# Patient Record
Sex: FEMALE | Race: WHITE | NOT HISPANIC OR LATINO | Employment: OTHER | URBAN - METROPOLITAN AREA
[De-identification: names, ages, dates, MRNs, and addresses within clinical notes are randomized per-mention and may not be internally consistent; named-entity substitution may affect disease eponyms.]

---

## 2017-01-06 RX ORDER — LEVOTHYROXINE SODIUM 50 UG/1
TABLET ORAL
Qty: 90 TABLET | Refills: 3 | Status: SHIPPED | OUTPATIENT
Start: 2017-01-06 | End: 2017-12-01

## 2017-02-24 RX ORDER — NORTRIPTYLINE HYDROCHLORIDE 25 MG/1
CAPSULE ORAL
Qty: 90 CAPSULE | Refills: 3 | Status: SHIPPED | OUTPATIENT
Start: 2017-02-24 | End: 2018-03-10 | Stop reason: SDUPTHER

## 2017-05-29 ENCOUNTER — OFFICE VISIT (OUTPATIENT)
Dept: FAMILY MEDICINE | Facility: CLINIC | Age: 72
End: 2017-05-29
Payer: MEDICARE

## 2017-05-29 VITALS
RESPIRATION RATE: 18 BRPM | WEIGHT: 155.19 LBS | BODY MASS INDEX: 26.49 KG/M2 | DIASTOLIC BLOOD PRESSURE: 68 MMHG | HEART RATE: 63 BPM | SYSTOLIC BLOOD PRESSURE: 128 MMHG | OXYGEN SATURATION: 96 % | HEIGHT: 64 IN

## 2017-05-29 DIAGNOSIS — L40.9 PSORIASIS OF NAIL: ICD-10-CM

## 2017-05-29 DIAGNOSIS — Z12.39 BREAST CANCER SCREENING: ICD-10-CM

## 2017-05-29 DIAGNOSIS — E03.9 ACQUIRED HYPOTHYROIDISM: ICD-10-CM

## 2017-05-29 DIAGNOSIS — F41.9 ANXIETY: ICD-10-CM

## 2017-05-29 DIAGNOSIS — I10 ESSENTIAL HYPERTENSION: Primary | ICD-10-CM

## 2017-05-29 DIAGNOSIS — E78.5 HYPERLIPIDEMIA, UNSPECIFIED HYPERLIPIDEMIA TYPE: ICD-10-CM

## 2017-05-29 PROCEDURE — 1126F AMNT PAIN NOTED NONE PRSNT: CPT | Mod: S$GLB,,, | Performed by: FAMILY MEDICINE

## 2017-05-29 PROCEDURE — 99999 PR PBB SHADOW E&M-EST. PATIENT-LVL III: CPT | Mod: PBBFAC,,, | Performed by: FAMILY MEDICINE

## 2017-05-29 PROCEDURE — 1159F MED LIST DOCD IN RCRD: CPT | Mod: S$GLB,,, | Performed by: FAMILY MEDICINE

## 2017-05-29 PROCEDURE — 99214 OFFICE O/P EST MOD 30 MIN: CPT | Mod: S$GLB,,, | Performed by: FAMILY MEDICINE

## 2017-05-29 PROCEDURE — 99499 UNLISTED E&M SERVICE: CPT | Mod: S$GLB,,, | Performed by: FAMILY MEDICINE

## 2017-05-29 RX ORDER — ATENOLOL 25 MG/1
25 TABLET ORAL DAILY
Qty: 90 TABLET | Refills: 3 | Status: SHIPPED | OUTPATIENT
Start: 2017-05-29 | End: 2018-06-05 | Stop reason: SDUPTHER

## 2017-05-29 RX ORDER — CLOTRIMAZOLE AND BETAMETHASONE DIPROPIONATE 10; .64 MG/G; MG/G
CREAM TOPICAL 2 TIMES DAILY
Qty: 45 G | Refills: 2 | Status: SHIPPED | OUTPATIENT
Start: 2017-05-29 | End: 2017-12-01

## 2017-05-29 RX ORDER — FENOFIBRATE 160 MG/1
160 TABLET ORAL DAILY
Qty: 90 TABLET | Refills: 3 | Status: SHIPPED | OUTPATIENT
Start: 2017-05-29 | End: 2018-07-22 | Stop reason: SDUPTHER

## 2017-05-29 RX ORDER — ESCITALOPRAM OXALATE 20 MG/1
20 TABLET ORAL DAILY
COMMUNITY
End: 2017-05-29 | Stop reason: SDUPTHER

## 2017-05-29 RX ORDER — ESCITALOPRAM OXALATE 20 MG/1
20 TABLET ORAL DAILY
Qty: 90 TABLET | Refills: 3 | Status: SHIPPED | OUTPATIENT
Start: 2017-05-29 | End: 2018-06-24 | Stop reason: SDUPTHER

## 2017-05-29 NOTE — PROGRESS NOTES
Subjective:       Patient ID: Odette Hewitt is a 71 y.o. female.    Chief Complaint: Hypertension (f/u) and Medication Refill    HPI     htn stable.    Has scales on nails of hands > 2 years.  Also, c/o dryness and redness of the proximal phalanxes of hands > 2 years    Review of Systems      Review of Systems   Constitutional: Negative for fever and chills.   HENT: Negative for hearing loss and neck stiffness.    Eyes: Negative for redness and itching.   Respiratory: Negative for cough and choking.    Cardiovascular: Negative for chest pain and leg swelling.  Abdomen: Negative for abdominal pain and blood in stool.   Genitourinary: Negative for dysuria and flank pain.   Musculoskeletal: Negative for back pain and gait problem.   Neurological: Negative for light-headedness and headaches.   Hematological: Negative for adenopathy.   Psychiatric/Behavioral: Negative for behavioral problems.     Objective:      Physical Exam   Constitutional: She appears well-developed.   HENT:   Head: Normocephalic and atraumatic.   Eyes: Conjunctivae are normal. Pupils are equal, round, and reactive to light.   Neck: Normal range of motion.   Cardiovascular: Normal rate and regular rhythm.    No murmur heard.  Pulmonary/Chest: Effort normal and breath sounds normal. She has no wheezes.   Lymphadenopathy:     She has no cervical adenopathy.       Assessment:       1. Essential hypertension    2. Psoriasis of nail    3. Breast cancer screening    4. Anxiety    5. Hyperlipidemia, unspecified hyperlipidemia type    6. Acquired hypothyroidism        Plan:       Essential hypertension  -     Comprehensive metabolic panel; Future; Expected date: 11/29/2017  -     Lipid panel; Future; Expected date: 11/29/2017  -     TSH; Future; Expected date: 11/29/2017    Psoriasis of nail  -     Ambulatory referral to Dermatology    Breast cancer screening  -     Mammo Digital Screening Bilat with CAD; Future; Expected date:  11/29/2017    Anxiety    Hyperlipidemia, unspecified hyperlipidemia type    Acquired hypothyroidism    Other orders  -     clotrimazole-betamethasone 1-0.05% (LOTRISONE) cream; Apply topically 2 (two) times daily.  Dispense: 45 g; Refill: 2  -     atenolol (TENORMIN) 25 MG tablet; Take 1 tablet (25 mg total) by mouth once daily.  Dispense: 90 tablet; Refill: 3  -     escitalopram oxalate (LEXAPRO) 20 MG tablet; Take 1 tablet (20 mg total) by mouth once daily.  Dispense: 90 tablet; Refill: 3  -     fenofibrate 160 MG Tab; Take 1 tablet (160 mg total) by mouth once daily.  Dispense: 90 tablet; Refill: 3      Plan:  See orders  Start lotrisone for psoriasis. Refer to derm      Medication List with Changes/Refills   New Medications    CLOTRIMAZOLE-BETAMETHASONE 1-0.05% (LOTRISONE) CREAM    Apply topically 2 (two) times daily.   Current Medications    FISH OIL-OMEGA-3 FATTY ACIDS 300-1,000 MG CAPSULE    Take 2 g by mouth once daily.    HYDROCORTISONE 2.5 % CREAM    Apply topically 2 (two) times daily. To small area, thin coat only, smallest area needed    LEVOTHYROXINE (SYNTHROID) 50 MCG TABLET    TAKE ONE TABLET BY MOUTH BEFORE BREAKFAST    NORTRIPTYLINE (PAMELOR) 25 MG CAPSULE    TAKE (1) TABLET BY MOUTH EACH EVENING   Changed and/or Refilled Medications    Modified Medication Previous Medication    ATENOLOL (TENORMIN) 25 MG TABLET atenolol (TENORMIN) 25 MG tablet       Take 1 tablet (25 mg total) by mouth once daily.    Take 1 tablet (25 mg total) by mouth once daily.    ESCITALOPRAM OXALATE (LEXAPRO) 20 MG TABLET escitalopram oxalate (LEXAPRO) 20 MG tablet       Take 1 tablet (20 mg total) by mouth once daily.    Take 20 mg by mouth once daily.    FENOFIBRATE 160 MG TAB fenofibrate 160 MG Tab       Take 1 tablet (160 mg total) by mouth once daily.    Take 1 tablet (160 mg total) by mouth once daily.   Discontinued Medications    ESCITALOPRAM OXALATE (LEXAPRO) 20 MG TABLET    Take 1 tablet (20 mg total) by mouth once  daily.

## 2017-06-02 ENCOUNTER — TELEPHONE (OUTPATIENT)
Dept: FAMILY MEDICINE | Facility: CLINIC | Age: 72
End: 2017-06-02

## 2017-06-16 ENCOUNTER — TELEPHONE (OUTPATIENT)
Dept: FAMILY MEDICINE | Facility: CLINIC | Age: 72
End: 2017-06-16

## 2017-07-05 RX ORDER — ESCITALOPRAM OXALATE 20 MG/1
TABLET ORAL
Qty: 90 TABLET | Refills: 3 | Status: SHIPPED | OUTPATIENT
Start: 2017-07-05 | End: 2017-12-01 | Stop reason: SDUPTHER

## 2017-08-04 ENCOUNTER — TELEPHONE (OUTPATIENT)
Dept: FAMILY MEDICINE | Facility: CLINIC | Age: 72
End: 2017-08-04

## 2017-08-04 NOTE — TELEPHONE ENCOUNTER
----- Message from Stella Andujar sent at 8/4/2017 11:01 AM CDT -----  Contact: [facility] Olive  Calling to request orders to go into the patient's home to conduct a spirometra test.     Please call Olive back at [phone]    Fax number [phone]    Thank you

## 2017-08-07 NOTE — TELEPHONE ENCOUNTER
Spoke with Olive, from [facility], states needs orders to go into home to conduct spirometry testing  Based on claim from 5/29/2017 indicating that she had COPD - all members age 40+ gets this testing done to confirm dx.      (staff - Do not need MNF,  already has) Fax number listed below.

## 2017-08-31 ENCOUNTER — TELEPHONE (OUTPATIENT)
Dept: FAMILY MEDICINE | Facility: CLINIC | Age: 72
End: 2017-08-31

## 2017-09-21 ENCOUNTER — TELEPHONE (OUTPATIENT)
Dept: FAMILY MEDICINE | Facility: CLINIC | Age: 72
End: 2017-09-21

## 2017-09-28 ENCOUNTER — TELEPHONE (OUTPATIENT)
Dept: FAMILY MEDICINE | Facility: CLINIC | Age: 72
End: 2017-09-28

## 2017-11-17 ENCOUNTER — PATIENT OUTREACH (OUTPATIENT)
Dept: ADMINISTRATIVE | Facility: HOSPITAL | Age: 72
End: 2017-11-17

## 2017-11-17 NOTE — PROGRESS NOTES
Pre-visit outreach via mail      Health Maintenance Due   Topic Date Due    Pneumococcal (65+) (2 of 2 - PPSV23) 05/27/2017    Influenza Vaccine  08/01/2017    DEXA SCAN  11/11/2017    Mammogram  12/01/2017

## 2017-11-17 NOTE — LETTER
November 17, 2017    Odette Hewitt  [address] [id number]             [facility]  [address]  [facility] [address]  Phone: [phone] Dear Ms. Hewitt:    [facility] is committed to your overall health.  To help you get the most out of each of your visits, we will review your information to make sure you are up to date on all of your recommended tests and/or procedures.      Dr. Benítez        has found that you may be due for:    Pneumonia immunization  Influenza vaccine  Osteoporosis screening (DEXA SCAN)    REMINDER:  lab appointment  11/29/17  Mammogram  12/1/17 at 2:45 pm    If you have had any of the above done at another facility, please bring the records or information with you so that your record at [facility] will be complete.     If you are currently taking medication, please bring it with you to your appointment for review.    If you have any questions or concerns, please don't hesitate to call.    Sincerely,    Kaitlin Milligan  Clinical Care Coordinator  [facility] [address] [facility] [address]  [place] [address]  Phone: [phone]   Fax: [phone]

## 2017-11-30 ENCOUNTER — LAB VISIT (OUTPATIENT)
Dept: LAB | Facility: HOSPITAL | Age: 72
End: 2017-11-30
Attending: FAMILY MEDICINE
Payer: MEDICARE

## 2017-11-30 DIAGNOSIS — I10 ESSENTIAL HYPERTENSION: ICD-10-CM

## 2017-11-30 LAB
ALBUMIN SERPL BCP-MCNC: 3.5 G/DL
ALP SERPL-CCNC: 52 U/L
ALT SERPL W/O P-5'-P-CCNC: 32 U/L
ANION GAP SERPL CALC-SCNC: 7 MMOL/L
AST SERPL-CCNC: 36 U/L
BILIRUB SERPL-MCNC: 0.3 MG/DL
BUN SERPL-MCNC: 18 MG/DL
CALCIUM SERPL-MCNC: 9.7 MG/DL
CHLORIDE SERPL-SCNC: 105 MMOL/L
CHOLEST SERPL-MCNC: 200 MG/DL
CHOLEST/HDLC SERPL: 5.1 {RATIO}
CO2 SERPL-SCNC: 29 MMOL/L
CREAT SERPL-MCNC: 0.9 MG/DL
EST. GFR  (AFRICAN AMERICAN): >60 ML/MIN/1.73 M^2
EST. GFR  (NON AFRICAN AMERICAN): >60 ML/MIN/1.73 M^2
GLUCOSE SERPL-MCNC: 101 MG/DL
HDLC SERPL-MCNC: 39 MG/DL
HDLC SERPL: 19.5 %
LDLC SERPL CALC-MCNC: 126.2 MG/DL
NONHDLC SERPL-MCNC: 161 MG/DL
POTASSIUM SERPL-SCNC: 4.3 MMOL/L
PROT SERPL-MCNC: 6.9 G/DL
SODIUM SERPL-SCNC: 141 MMOL/L
T4 FREE SERPL-MCNC: 1.01 NG/DL
TRIGL SERPL-MCNC: 174 MG/DL
TSH SERPL DL<=0.005 MIU/L-ACNC: 4.27 UIU/ML

## 2017-11-30 PROCEDURE — 80053 COMPREHEN METABOLIC PANEL: CPT

## 2017-11-30 PROCEDURE — 80061 LIPID PANEL: CPT

## 2017-11-30 PROCEDURE — 84439 ASSAY OF FREE THYROXINE: CPT

## 2017-11-30 PROCEDURE — 36415 COLL VENOUS BLD VENIPUNCTURE: CPT | Mod: PO

## 2017-11-30 PROCEDURE — 84443 ASSAY THYROID STIM HORMONE: CPT

## 2017-12-01 ENCOUNTER — HOSPITAL ENCOUNTER (OUTPATIENT)
Dept: RADIOLOGY | Facility: HOSPITAL | Age: 72
Discharge: HOME | End: 2017-12-01
Attending: FAMILY MEDICINE
Payer: MEDICARE

## 2017-12-01 ENCOUNTER — OFFICE VISIT (OUTPATIENT)
Dept: FAMILY MEDICINE | Facility: CLINIC | Age: 72
End: 2017-12-01
Payer: MEDICARE

## 2017-12-01 VITALS
OXYGEN SATURATION: 97 % | SYSTOLIC BLOOD PRESSURE: 98 MMHG | BODY MASS INDEX: 27.1 KG/M2 | RESPIRATION RATE: 18 BRPM | HEART RATE: 67 BPM | HEIGHT: 64 IN | DIASTOLIC BLOOD PRESSURE: 64 MMHG | WEIGHT: 158.75 LBS

## 2017-12-01 DIAGNOSIS — Z78.0 MENOPAUSE: ICD-10-CM

## 2017-12-01 DIAGNOSIS — L40.9 PSORIASIS OF NAIL: ICD-10-CM

## 2017-12-01 DIAGNOSIS — E03.9 HYPOTHYROIDISM, UNSPECIFIED TYPE: ICD-10-CM

## 2017-12-01 DIAGNOSIS — I10 ESSENTIAL HYPERTENSION: ICD-10-CM

## 2017-12-01 DIAGNOSIS — F41.9 ANXIETY: ICD-10-CM

## 2017-12-01 DIAGNOSIS — Z00.00 ROUTINE MEDICAL EXAM: Primary | ICD-10-CM

## 2017-12-01 DIAGNOSIS — Z12.39 BREAST CANCER SCREENING: ICD-10-CM

## 2017-12-01 PROCEDURE — 77067 SCR MAMMO BI INCL CAD: CPT | Mod: TC,PO

## 2017-12-01 PROCEDURE — 99999 PR PBB SHADOW E&M-EST. PATIENT-LVL III: CPT | Mod: PBBFAC,,, | Performed by: FAMILY MEDICINE

## 2017-12-01 PROCEDURE — 77081 DXA BONE DENSITY APPENDICULR: CPT | Mod: TC,PO

## 2017-12-01 PROCEDURE — 77067 SCR MAMMO BI INCL CAD: CPT | Mod: 26,,, | Performed by: RADIOLOGY

## 2017-12-01 PROCEDURE — 77063 BREAST TOMOSYNTHESIS BI: CPT | Mod: 26,,, | Performed by: RADIOLOGY

## 2017-12-01 PROCEDURE — 90732 PPSV23 VACC 2 YRS+ SUBQ/IM: CPT | Mod: S$GLB,,, | Performed by: FAMILY MEDICINE

## 2017-12-01 PROCEDURE — 99499 UNLISTED E&M SERVICE: CPT | Mod: S$GLB,,, | Performed by: FAMILY MEDICINE

## 2017-12-01 PROCEDURE — 77081 DXA BONE DENSITY APPENDICULR: CPT | Mod: 26,,, | Performed by: RADIOLOGY

## 2017-12-01 PROCEDURE — 99397 PER PM REEVAL EST PAT 65+ YR: CPT | Mod: S$GLB,,, | Performed by: FAMILY MEDICINE

## 2017-12-01 PROCEDURE — G0009 ADMIN PNEUMOCOCCAL VACCINE: HCPCS | Mod: S$GLB,,, | Performed by: FAMILY MEDICINE

## 2017-12-01 RX ORDER — CALCIPOTRIENE, BETAMETHASONE DIPROPIONATE 50; .643 UG/G; MG/G
OINTMENT TOPICAL DAILY
Qty: 100 G | Refills: 2 | Status: SHIPPED | OUTPATIENT
Start: 2017-12-01

## 2017-12-01 RX ORDER — LEVOTHYROXINE SODIUM 75 UG/1
75 TABLET ORAL DAILY
Qty: 30 TABLET | Refills: 1 | Status: SHIPPED | OUTPATIENT
Start: 2017-12-01 | End: 2018-01-25 | Stop reason: SDUPTHER

## 2017-12-03 NOTE — PROGRESS NOTES
Subjective:       Patient ID: Odette Hewitt is a 72 y.o. female.    Chief Complaint: Hypertension (f/u); Foot Swelling (left); and Health Maintenance (pneumococcal vaccine)    HPI     Reviewed recent results. Mild elevation in tsh.    htn stable.     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     Anxiety stable.       Review of Systems      Review of Systems   Constitutional: Negative for fever and chills.   HENT: Negative for hearing loss and neck stiffness.    Eyes: Negative for redness and itching.   Respiratory: Negative for cough and choking.    Cardiovascular: Negative for chest pain and leg swelling.  Abdomen: Negative for abdominal pain and blood in stool.   Genitourinary: Negative for dysuria and flank pain.   Musculoskeletal: Negative for back pain and gait problem.   Neurological: Negative for light-headedness and headaches.   Hematological: Negative for adenopathy.   Psychiatric/Behavioral: Negative for behavioral problems.     Objective:      Physical Exam   Constitutional: She is oriented to person, place, and time. She appears well-developed. No distress.   HENT:   Head: Normocephalic.   Mouth/Throat: Oropharynx is clear and moist.   Eyes: Pupils are equal, round, and reactive to light.   Neck: Normal range of motion. Neck supple. No thyromegaly present.   Cardiovascular: Normal rate, regular rhythm and normal heart sounds.  Exam reveals no friction rub.    No murmur heard.  Pulmonary/Chest: Breath sounds normal. She has no wheezes. She has no rales.   Abdominal: Soft. Bowel sounds are normal. She exhibits no distension and no mass. There is no tenderness.   Musculoskeletal: Normal range of motion. She exhibits no edema or tenderness.   Neurological: She is alert and oriented to person, place, and time. She has normal reflexes. No cranial nerve deficit.   Skin: Skin is warm. No rash noted. No erythema.   Psychiatric: She has a normal mood and affect. Thought content normal.         Lab Visit on 11/30/2017    Component Date Value Ref Range Status    Sodium 11/30/2017 141  136 - 145 mmol/L Final    Potassium 11/30/2017 4.3  3.5 - 5.1 mmol/L Final    Chloride 11/30/2017 105  95 - 110 mmol/L Final    CO2 11/30/2017 29  23 - 29 mmol/L Final    Glucose 11/30/2017 101  70 - 110 mg/dL Final    BUN, Bld 11/30/2017 18  8 - 23 mg/dL Final    Creatinine 11/30/2017 0.9  0.5 - 1.4 mg/dL Final    Calcium 11/30/2017 9.7  8.7 - 10.5 mg/dL Final    Total Protein 11/30/2017 6.9  6.0 - 8.4 g/dL Final    Albumin 11/30/2017 3.5  3.5 - 5.2 g/dL Final    Total Bilirubin 11/30/2017 0.3  0.1 - 1.0 mg/dL Final    Comment: For infants and newborns, interpretation of results should be based  on gestational age, weight and in agreement with clinical  observations.  Premature Infant recommended reference ranges:  Up to 24 hours.............<8.0 mg/dL  Up to 48 hours............<12.0 mg/dL  3-5 days..................<15.0 mg/dL  6-29 days.................<15.0 mg/dL      Alkaline Phosphatase 11/30/2017 52* 55 - 135 U/L Final    AST 11/30/2017 36  10 - 40 U/L Final    ALT 11/30/2017 32  10 - 44 U/L Final    Anion Gap 11/30/2017 7* 8 - 16 mmol/L Final    eGFR if African American 11/30/2017 >60.0  >60 mL/min/1.73 m^2 Final    eGFR if non African American 11/30/2017 >60.0  >60 mL/min/1.73 m^2 Final    Comment: Calculation used to obtain the estimated glomerular filtration  rate (eGFR) is the CKD-EPI equation.       Cholesterol 11/30/2017 200* 120 - 199 mg/dL Final    Comment: The National Cholesterol Education Program (NCEP) has set the  following guidelines (reference ranges) for Cholesterol:  Optimal.....................<200 mg/dL  Borderline High.............200-239 mg/dL  High........................> or = 240 mg/dL      Triglycerides 11/30/2017 174* 30 - 150 mg/dL Final    Comment: The National Cholesterol Education Program (NCEP) has set the  following guidelines (reference values) for  triglycerides:  Normal......................<150 mg/dL  Borderline High.............150-199 mg/dL  High........................200-499 mg/dL      HDL 11/30/2017 39* 40 - 75 mg/dL Final    Comment: The National Cholesterol Education Program (NCEP) has set the  following guidelines (reference values) for HDL Cholesterol:  Low...............<40 mg/dL  Optimal...........>60 mg/dL      LDL Cholesterol 11/30/2017 126.2  63.0 - 159.0 mg/dL Final    Comment: The National Cholesterol Education Program (NCEP) has set the  following guidelines (reference values) for LDL Cholesterol:  Optimal.......................<130 mg/dL  Borderline High...............130-159 mg/dL  High..........................160-189 mg/dL  Very High.....................>190 mg/dL      HDL/Chol Ratio 11/30/2017 19.5* 20.0 - 50.0 % Final    Total Cholesterol/HDL Ratio 11/30/2017 5.1* 2.0 - 5.0 Final    Non-HDL Cholesterol 11/30/2017 161  mg/dL Final    Comment: Risk category and Non-HDL cholesterol goals:  Coronary heart disease (CHD)or equivalent (10-year risk of CHD >20%):  Non-HDL cholesterol goal     <130 mg/dL  Two or more CHD risk factors and 10-year risk of CHD <= 20%:  Non-HDL cholesterol goal     <160 mg/dL  0 to 1 CHD risk factor:  Non-HDL cholesterol goal     <190 mg/dL      TSH 11/30/2017 4.269* 0.400 - 4.000 uIU/mL Final    Free T4 11/30/2017 1.01  0.71 - 1.51 ng/dL Final           Assessment:       1. Routine medical exam    2. Menopause    3. Hypothyroidism, unspecified type    4. Essential hypertension    5. Anxiety    6. Psoriasis of nail        Plan:       Routine medical exam    Menopause  -     DXA Bone Density Appendicular Skeleton; Future; Expected date: 12/01/2017    Hypothyroidism, unspecified type  -     TSH; Future; Expected date: 02/01/2018    Essential hypertension    Anxiety    Psoriasis of nail    Other orders  -     (In Office Administered) Pneumococcal Polysaccharide Vaccine (23 Valent) (SQ/IM)  -      calcipotriene-betamethasone (TACLONEX) ointment; Apply topically once daily.  Dispense: 100 g; Refill: 2  -     levothyroxine (SYNTHROID) 75 MCG tablet; Take 1 tablet (75 mcg total) by mouth once daily.  Dispense: 30 tablet; Refill: 1          Plan:  Start taclonex for psoriatic nails  Cont all other meds      Medication List with Changes/Refills   New Medications    CALCIPOTRIENE-BETAMETHASONE (TACLONEX) OINTMENT    Apply topically once daily.    LEVOTHYROXINE (SYNTHROID) 75 MCG TABLET    Take 1 tablet (75 mcg total) by mouth once daily.   Current Medications    ATENOLOL (TENORMIN) 25 MG TABLET    Take 1 tablet (25 mg total) by mouth once daily.    ESCITALOPRAM OXALATE (LEXAPRO) 20 MG TABLET    Take 1 tablet (20 mg total) by mouth once daily.    FENOFIBRATE 160 MG TAB    Take 1 tablet (160 mg total) by mouth once daily.    FISH OIL-OMEGA-3 FATTY ACIDS 300-1,000 MG CAPSULE    Take 2 g by mouth once daily.    HYDROCORTISONE 2.5 % CREAM    Apply topically 2 (two) times daily. To small area, thin coat only, smallest area needed    NORTRIPTYLINE (PAMELOR) 25 MG CAPSULE    TAKE (1) TABLET BY MOUTH EACH EVENING   Discontinued Medications    CLOTRIMAZOLE-BETAMETHASONE 1-0.05% (LOTRISONE) CREAM    Apply topically 2 (two) times daily.    ESCITALOPRAM OXALATE (LEXAPRO) 20 MG TABLET    TAKE 1 TABLET BY MOUTH ONCE DAILY    LEVOTHYROXINE (SYNTHROID) 50 MCG TABLET    TAKE ONE TABLET BY MOUTH BEFORE BREAKFAST

## 2018-01-04 RX ORDER — LEVOTHYROXINE SODIUM 50 UG/1
TABLET ORAL
Qty: 90 TABLET | Refills: 3 | OUTPATIENT
Start: 2018-01-04

## 2018-01-04 NOTE — TELEPHONE ENCOUNTER
Patient states she is on 75mcg daily since her Dec 1, 2017 visit with Dr Benítez. She states she is to stay on that dose and then have labs drawn in Feb and Dr Benítez told her he would assess again after lab results are in. She states she has plenty of medication to last her until then and Dr Benítez stated he would give more refills after labs. This refill is not needed. It was initiated by [employer].

## 2018-01-28 RX ORDER — LEVOTHYROXINE SODIUM 75 UG/1
75 TABLET ORAL DAILY
Qty: 30 TABLET | Refills: 1 | Status: SHIPPED | OUTPATIENT
Start: 2018-01-28 | End: 2018-03-31 | Stop reason: SDUPTHER

## 2018-02-01 ENCOUNTER — LAB VISIT (OUTPATIENT)
Dept: LAB | Facility: HOSPITAL | Age: 73
End: 2018-02-01
Attending: FAMILY MEDICINE
Payer: MEDICARE

## 2018-02-01 DIAGNOSIS — E03.9 HYPOTHYROIDISM, UNSPECIFIED TYPE: ICD-10-CM

## 2018-02-01 LAB — TSH SERPL DL<=0.005 MIU/L-ACNC: 1.35 UIU/ML

## 2018-02-01 PROCEDURE — 84443 ASSAY THYROID STIM HORMONE: CPT

## 2018-02-01 PROCEDURE — 36415 COLL VENOUS BLD VENIPUNCTURE: CPT | Mod: PO

## 2018-02-09 ENCOUNTER — TELEPHONE (OUTPATIENT)
Dept: FAMILY MEDICINE | Facility: CLINIC | Age: 73
End: 2018-02-09

## 2018-02-09 ENCOUNTER — OFFICE VISIT (OUTPATIENT)
Dept: FAMILY MEDICINE | Facility: CLINIC | Age: 73
End: 2018-02-09
Payer: MEDICARE

## 2018-02-09 VITALS
DIASTOLIC BLOOD PRESSURE: 66 MMHG | OXYGEN SATURATION: 97 % | SYSTOLIC BLOOD PRESSURE: 120 MMHG | BODY MASS INDEX: 26.61 KG/M2 | RESPIRATION RATE: 16 BRPM | HEART RATE: 70 BPM | WEIGHT: 155.88 LBS | TEMPERATURE: 99 F | HEIGHT: 64 IN

## 2018-02-09 DIAGNOSIS — R21 RASH: ICD-10-CM

## 2018-02-09 DIAGNOSIS — E03.9 HYPOTHYROIDISM, UNSPECIFIED TYPE: Primary | ICD-10-CM

## 2018-02-09 DIAGNOSIS — I10 ESSENTIAL HYPERTENSION: ICD-10-CM

## 2018-02-09 DIAGNOSIS — J02.9 SORE THROAT: ICD-10-CM

## 2018-02-09 PROCEDURE — 87186 SC STD MICRODIL/AGAR DIL: CPT

## 2018-02-09 PROCEDURE — 99999 PR PBB SHADOW E&M-EST. PATIENT-LVL IV: CPT | Mod: PBBFAC,,, | Performed by: NURSE PRACTITIONER

## 2018-02-09 PROCEDURE — 99499 UNLISTED E&M SERVICE: CPT | Mod: S$GLB,,, | Performed by: NURSE PRACTITIONER

## 2018-02-09 PROCEDURE — 99214 OFFICE O/P EST MOD 30 MIN: CPT | Mod: S$GLB,,, | Performed by: NURSE PRACTITIONER

## 2018-02-09 PROCEDURE — 1159F MED LIST DOCD IN RCRD: CPT | Mod: S$GLB,,, | Performed by: NURSE PRACTITIONER

## 2018-02-09 PROCEDURE — 87077 CULTURE AEROBIC IDENTIFY: CPT

## 2018-02-09 PROCEDURE — 3008F BODY MASS INDEX DOCD: CPT | Mod: S$GLB,,, | Performed by: NURSE PRACTITIONER

## 2018-02-09 PROCEDURE — 1125F AMNT PAIN NOTED PAIN PRSNT: CPT | Mod: S$GLB,,, | Performed by: NURSE PRACTITIONER

## 2018-02-09 PROCEDURE — 87070 CULTURE OTHR SPECIMN AEROBIC: CPT

## 2018-02-09 NOTE — TELEPHONE ENCOUNTER
----- Message from Rosa Bennett NP sent at 2/9/2018 10:44 AM CST -----  I dont think she scheduled with dermatology at check out--please call and schedule

## 2018-02-09 NOTE — PROGRESS NOTES
Subjective:       Patient ID: Odette Hewitt is a 72 y.o. female.    Chief Complaint: Results (tsh); Rash (facial); Sore Throat; Nasal Congestion; and Excessive Sweating    Ms. Hewitt is a new patient to me. She presents today for sore throat, rash and follow up of TSH level.    Sore Throat    This is a new problem. Episode onset: 2 days ago. The problem has been unchanged. There has been no fever. Pertinent negatives include no coughing, diarrhea, headaches, hoarse voice, shortness of breath or trouble swallowing. She has tried gargles for the symptoms. The treatment provided mild relief.     Hypothyroid: TSH increased to 75mcg in December, recent TSH normalized  Vitals:    02/09/18 0950   BP: 120/66   Pulse: 70   Resp: 16   Temp: 98.6 °F (37 °C)     Review of Systems   Constitutional: Negative for chills and fever.   HENT: Positive for sore throat. Negative for facial swelling, hoarse voice and trouble swallowing.    Eyes: Negative for discharge and redness.   Respiratory: Negative for cough and shortness of breath.    Cardiovascular: Negative for chest pain and palpitations.   Gastrointestinal: Negative for constipation and diarrhea.   Musculoskeletal: Negative for gait problem.   Skin: Positive for rash. Negative for wound.   Neurological: Negative for facial asymmetry, speech difficulty and headaches.   Psychiatric/Behavioral: Negative for confusion. The patient is not nervous/anxious.        Past Medical History:   Diagnosis Date    Chronic arm pain     on pamelor    HTN (hypertension)     Hyperlipidemia     Hypothyroid 2006    Osteopenia     Refusal of blood transfusions as patient is Sabianist      Objective:      Physical Exam   Constitutional: She is oriented to person, place, and time. She does not have a sickly appearance. No distress.   HENT:   Head: Normocephalic.   Right Ear: Hearing normal.   Left Ear: Hearing normal.   Nose: Nose normal.   Mouth/Throat: Uvula is midline, oropharynx is  clear and moist and mucous membranes are normal.   Eyes: Conjunctivae and lids are normal.   Neck: No JVD present. No tracheal deviation present.   Cardiovascular: Normal rate, regular rhythm, S1 normal, S2 normal and normal heart sounds.    Pulmonary/Chest: Effort normal and breath sounds normal. She exhibits no tenderness.   Abdominal: Normal appearance. She exhibits no distension.   Musculoskeletal: Normal range of motion. She exhibits no edema or deformity.   Neurological: She is alert and oriented to person, place, and time.   Skin: Rash noted. She is not diaphoretic. No pallor.        Psychiatric: She has a normal mood and affect. Her speech is normal and behavior is normal. Judgment and thought content normal. Cognition and memory are normal.   Nursing note and vitals reviewed.      Assessment:       1. Hypothyroidism, unspecified type    2. Essential hypertension    3. Rash    4. Sore throat        Plan:       Hypothyroidism, unspecified type   Stable, continue levothyroxine 75mcg dose    Essential hypertension   controlled    Rash  -     Ambulatory referral to Dermatology    Sore throat  -     POCT rapid strep A  -     Throat culture  - WSW gargles, lozenges, warm tea        Follow-up if symptoms worsen or fail to improve.

## 2018-02-12 DIAGNOSIS — B95.8 STAPHYLOCOCCAL INFECTION: Primary | ICD-10-CM

## 2018-02-12 LAB — BACTERIA THROAT CULT: NORMAL

## 2018-02-12 RX ORDER — DOXYCYCLINE HYCLATE 100 MG
100 TABLET ORAL EVERY 12 HOURS
Qty: 14 TABLET | Refills: 0 | Status: SHIPPED | OUTPATIENT
Start: 2018-02-12 | End: 2018-02-19

## 2018-02-14 ENCOUNTER — TELEPHONE (OUTPATIENT)
Dept: FAMILY MEDICINE | Facility: CLINIC | Age: 73
End: 2018-02-14

## 2018-02-14 NOTE — TELEPHONE ENCOUNTER
Please see below. She gets sick right after taking the medication. She will vomit 2-3 times after taking med, then done. Nausea remains. She is asking for alternative.  Please advise.

## 2018-02-14 NOTE — TELEPHONE ENCOUNTER
Spoke with pt. Instructed to take medication with food. Verbalized understanding. States that she has been eating after taking, approximately 1.5 hour due to instructions to not take with food. Instructed again, that Abtx is ok to take with food, right before, right after. States that she will try it in the morning and if she get sick again, she will call us back.

## 2018-02-14 NOTE — TELEPHONE ENCOUNTER
----- Message from Kevin Baltazar sent at 2/14/2018 10:01 AM CST -----  Contact: Patient  Odette, [phone], calling in regards to Rx doxycycline (VIBRA-TABS) 100 MG tablet. Stated she have been vomiting since starting the medication. Would like to know if there's any other treatment options. Please advise. Thanks.

## 2018-03-09 ENCOUNTER — OFFICE VISIT (OUTPATIENT)
Dept: FAMILY MEDICINE | Facility: CLINIC | Age: 73
End: 2018-03-09
Payer: MEDICARE

## 2018-03-09 VITALS
WEIGHT: 156.5 LBS | SYSTOLIC BLOOD PRESSURE: 112 MMHG | RESPIRATION RATE: 16 BRPM | OXYGEN SATURATION: 97 % | BODY MASS INDEX: 26.72 KG/M2 | TEMPERATURE: 98 F | HEART RATE: 74 BPM | HEIGHT: 64 IN | DIASTOLIC BLOOD PRESSURE: 64 MMHG

## 2018-03-09 DIAGNOSIS — J31.0 RHINITIS, UNSPECIFIED CHRONICITY, UNSPECIFIED TYPE: Primary | ICD-10-CM

## 2018-03-09 DIAGNOSIS — I10 ESSENTIAL HYPERTENSION: ICD-10-CM

## 2018-03-09 DIAGNOSIS — Z86.14 HISTORY OF MRSA INFECTION: ICD-10-CM

## 2018-03-09 PROCEDURE — 3078F DIAST BP <80 MM HG: CPT | Mod: S$GLB,,, | Performed by: NURSE PRACTITIONER

## 2018-03-09 PROCEDURE — 87070 CULTURE OTHR SPECIMN AEROBIC: CPT

## 2018-03-09 PROCEDURE — 99214 OFFICE O/P EST MOD 30 MIN: CPT | Mod: S$GLB,,, | Performed by: NURSE PRACTITIONER

## 2018-03-09 PROCEDURE — 99999 PR PBB SHADOW E&M-EST. PATIENT-LVL IV: CPT | Mod: PBBFAC,,, | Performed by: NURSE PRACTITIONER

## 2018-03-09 PROCEDURE — 87077 CULTURE AEROBIC IDENTIFY: CPT | Mod: 59

## 2018-03-09 PROCEDURE — 87186 SC STD MICRODIL/AGAR DIL: CPT | Mod: 59

## 2018-03-09 PROCEDURE — 3074F SYST BP LT 130 MM HG: CPT | Mod: S$GLB,,, | Performed by: NURSE PRACTITIONER

## 2018-03-09 NOTE — PROGRESS NOTES
Subjective:       Patient ID: Odette Hewitt is a 72 y.o. female.    Chief Complaint: Nasal Congestion (feels like she cannot breathe when lying down-wants to make sure staph is gone)    HPI   Ms. Hewitt is a known patient to me. Last seen 2/9/18 for sore throat, culture grew MRSA, treated with doxycycline, finished in full, symptoms resolved but she would like to make sure completely cleared. She reports nasal congestion, feels very stuffy at night, has not tried anything otc. Denies environmental allergies.   Vitals:    03/09/18 1031   BP: 112/64   Pulse: 74   Resp: 16   Temp: 98.4 °F (36.9 °C)     Review of Systems   Constitutional: Negative for diaphoresis and fever.   HENT: Positive for congestion and rhinorrhea. Negative for facial swelling, sinus pain, sinus pressure and trouble swallowing.    Eyes: Negative for discharge and redness.   Respiratory: Negative for cough and shortness of breath.    Cardiovascular: Negative for chest pain and palpitations.   Gastrointestinal: Negative for diarrhea and vomiting.   Genitourinary: Negative for difficulty urinating and flank pain.   Musculoskeletal: Negative for gait problem and myalgias.   Skin: Negative for rash and wound.   Neurological: Negative for facial asymmetry and speech difficulty.   Psychiatric/Behavioral: Negative for confusion. The patient is not nervous/anxious.        Past Medical History:   Diagnosis Date    Chronic arm pain     on pamelor    HTN (hypertension)     Hyperlipidemia     Hypothyroid 2006    Osteopenia     Refusal of blood transfusions as patient is Zoroastrian      Objective:      Physical Exam   Constitutional: She is oriented to person, place, and time. She does not have a sickly appearance. No distress.   HENT:   Head: Normocephalic.   Right Ear: Hearing normal.   Left Ear: Hearing normal.   Nose: Nose normal.   Mouth/Throat: Uvula is midline, oropharynx is clear and moist and mucous membranes are normal.   Eyes:  Conjunctivae and lids are normal.   Neck: No JVD present. No tracheal deviation present.   Cardiovascular: Normal rate, regular rhythm, S1 normal, S2 normal and normal heart sounds.    Pulmonary/Chest: Effort normal and breath sounds normal. She exhibits no tenderness.   Abdominal: Normal appearance. She exhibits no distension.   Musculoskeletal: Normal range of motion. She exhibits no edema or deformity.   Neurological: She is alert and oriented to person, place, and time.   Skin: She is not diaphoretic. No pallor.   Psychiatric: She has a normal mood and affect. Her speech is normal and behavior is normal. Judgment and thought content normal. Cognition and memory are normal.   Nursing note and vitals reviewed.      Assessment:       1. Rhinitis, unspecified chronicity, unspecified type    2. History of MRSA infection    3. Essential hypertension        Plan:       Rhinitis, unspecified chronicity, unspecified type   Educated on otc medication, supportive care, symptom relief    History of MRSA infection  -     Throat culture    Essential hypertension   controlled      Follow-up if symptoms worsen or fail to improve.

## 2018-03-10 RX ORDER — NORTRIPTYLINE HYDROCHLORIDE 25 MG/1
CAPSULE ORAL
Qty: 90 CAPSULE | Refills: 3 | Status: SHIPPED | OUTPATIENT
Start: 2018-03-10 | End: 2019-04-20 | Stop reason: SDUPTHER

## 2018-03-12 ENCOUNTER — INITIAL CONSULT (OUTPATIENT)
Dept: DERMATOLOGY | Facility: CLINIC | Age: 73
End: 2018-03-12
Payer: MEDICARE

## 2018-03-12 VITALS — BODY MASS INDEX: 26.63 KG/M2 | WEIGHT: 156 LBS | HEIGHT: 64 IN

## 2018-03-12 DIAGNOSIS — L60.3 DYSTROPHIC NAIL: ICD-10-CM

## 2018-03-12 DIAGNOSIS — L21.9 SEBORRHEIC DERMATITIS: ICD-10-CM

## 2018-03-12 DIAGNOSIS — L82.1 SEBORRHEIC KERATOSES: ICD-10-CM

## 2018-03-12 DIAGNOSIS — L30.9 ECZEMA, UNSPECIFIED TYPE: Primary | ICD-10-CM

## 2018-03-12 PROCEDURE — 99202 OFFICE O/P NEW SF 15 MIN: CPT | Mod: S$GLB,,, | Performed by: DERMATOLOGY

## 2018-03-12 PROCEDURE — 99999 PR PBB SHADOW E&M-EST. PATIENT-LVL III: CPT | Mod: PBBFAC,,, | Performed by: DERMATOLOGY

## 2018-03-12 NOTE — PROGRESS NOTES
Subjective:       Patient ID:  Odette Hewitt is a 72 y.o. female who presents for   Chief Complaint   Patient presents with    Nail Problem    Rash     73 yo F presents for evaluation of dry and brittle fingernails for a few years.  She has used Taclonex ointment & Clotrimazole and Beamethasone 1%/0.05% in the past, helped very little.  Washes hands approx 10 times per day.  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   Skin: Positive for itching, rash and dry skin. Negative for daily sunscreen use, recent sunburn and wears hat.        Objective:    Physical Exam   Constitutional: She appears well-developed and well-nourished.   Neurological: She is alert and oriented to person, place, and time.   Psychiatric: She has a normal mood and affect.   Skin:   Areas Examined (abnormalities noted in diagram):   Head / Face Inspection Performed  Neck Inspection Performed  RUE Inspected  LUE Inspection Performed  Nails and Digits Inspection Performed                               Diagram Legend     Erythematous scaling macule/papule c/w actinic keratosis       Vascular papule c/w angioma      Pigmented verrucoid papule/plaque c/w seborrheic keratosis      Yellow umbilicated papule c/w sebaceous hyperplasia      Irregularly shaped tan macule c/w lentigo     1-2 mm smooth white papules consistent with Milia      Movable subcutaneous cyst with punctum c/w epidermal inclusion cyst      Subcutaneous movable cyst c/w pilar cyst      Firm pink to brown papule c/w dermatofibroma      Pedunculated fleshy  papule(s) c/w skin tag(s)      Evenly pigmented macule c/w junctional nevus     Mildly variegated pigmented, slightly irregular-bordered macule c/w mildly atypical nevus      Flesh colored to evenly pigmented papule c/w intradermal nevus       Pink pearly papule/plaque c/w basal cell carcinoma      Erythematous hyperkeratotic cursted plaque c/w SCC      Surgical scar with no sign of skin cancer recurrence      Open and closed comedones      Inflammatory papules and pustules      Verrucoid papule consistent consistent with wart     Erythematous eczematous patches and plaques     Dystrophic onycholytic nail with subungual debris c/w onychomycosis     Umbilicated papule    Erythematous-base heme-crusted tan verrucoid plaque consistent with inflamed seborrheic keratosis     Erythematous Silvery Scaling Plaque c/w Psoriasis     See annotation      Assessment / Plan:        Eczema, unspecified type  Wear gloves when doing gardening or wet work  Encouraged liberal use of moisturizer as this can contribute to eczema flares/pruritis  Cont calcipotriene/betamethasone ointment bid    Dystrophic nail-habit tic deformity  Keep thumb nails wrapped  Be aware and try to avoid rubbing     Seborrheic Keratosis  These are benign inherited growths without a malignant potential. Reassurance given to patient. No treatment is necessary.     Seborrheic Dermatitis  HC + clotrimazole bid prn flare         Follow-up in about 2 months (around 5/12/2018).

## 2018-03-12 NOTE — LETTER
March 12, 2018      Rosa Bennett, ZEINA  [address] [facility] [address] [place] [address]           [facility]  [address] [facility] [address] [place] [address]  Phone: [phone]  Fax: [phone]          Patient: Odette Hewitt   MR Number: [id number]   YOB: 1945   Date of Visit: 3/12/2018       Dear Rosa Bennett:    Thank you for referring Odette Hewitt to me for evaluation. Attached you will find relevant portions of my assessment and plan of care.    If you have questions, please do not hesitate to call me. I look forward to following Odette Hewitt along with you.    Sincerely,    Adelita Borjas MD    Enclosure  CC:  No Recipients    If you would like to receive this communication electronically, please contact [email or web address] or [phone] to request more information on [facility] access.    For providers and/or their staff who would like to refer a patient to [facility], please contact us through our one-stop-shop provider referral line, [facility], at [phone].    If you feel you have received this communication in error or would no longer like to receive these types of communications, please e-mail [email or web address]

## 2018-03-13 DIAGNOSIS — A49.02 MRSA INFECTION: Primary | ICD-10-CM

## 2018-03-13 LAB — BACTERIA THROAT CULT: NORMAL

## 2018-03-13 RX ORDER — MUPIROCIN 20 MG/G
OINTMENT TOPICAL 2 TIMES DAILY
Qty: 30 G | Refills: 0 | Status: SHIPPED | OUTPATIENT
Start: 2018-03-13 | End: 2018-03-20

## 2018-03-13 RX ORDER — RIFAMPIN 300 MG/1
600 CAPSULE ORAL DAILY
Qty: 14 CAPSULE | Refills: 0 | Status: SHIPPED | OUTPATIENT
Start: 2018-03-13 | End: 2018-03-20

## 2018-03-13 RX ORDER — SULFAMETHOXAZOLE AND TRIMETHOPRIM 800; 160 MG/1; MG/1
1 TABLET ORAL 2 TIMES DAILY
Qty: 14 TABLET | Refills: 0 | Status: SHIPPED | OUTPATIENT
Start: 2018-03-13 | End: 2018-03-20

## 2018-03-15 ENCOUNTER — TELEPHONE (OUTPATIENT)
Dept: FAMILY MEDICINE | Facility: CLINIC | Age: 73
End: 2018-03-15

## 2018-03-15 NOTE — TELEPHONE ENCOUNTER
----- Message from Carine Colby MD sent at 3/14/2018  5:57 PM CDT -----  Mrs. Bennett  I have reviewed this patients chart. I see that you have already treated her with MRSA directed therapy and her symptoms improved. I also saw the repeat throat cultures. In the absence of symptoms, tonsillitis, or abscess culturing MRSA or MSSA is most likely indicative of colonization. 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 I recommend discontinuing rifampin and Bactrim at this time. Can try nasal decolonization however she will most likely also be recolonized once out and about. I will also cancel the appointment scheduled with me.   Please let me know if you have any questions  Dr. Lasha Colby

## 2018-03-15 NOTE — TELEPHONE ENCOUNTER
Please call and tell her I spoke with infectious disease Dr. Colby who advised to stop rifampin and bactrim, complete nasal bactroban as prescribed.

## 2018-03-20 ENCOUNTER — TELEPHONE (OUTPATIENT)
Dept: INFECTIOUS DISEASES | Facility: CLINIC | Age: 73
End: 2018-03-20

## 2018-03-20 NOTE — TELEPHONE ENCOUNTER
----- Message from Tamara Hansen sent at 3/20/2018  8:08 AM CDT -----  Contact: patient   Patient calling to speak to the Nurse. Patient states the appointment was cancelled and she was not notified. Please advise. Call to pod. No answer.   Call back  or    Thanks!

## 2018-03-20 NOTE — TELEPHONE ENCOUNTER
Spoke with patient and explained why her appt was cancelled, and that Dr Colby had sent Ms Bennett a note instructing the patient to stop the abx secondary to the MRSA being colonized and to also stop culturing the throat, unless she was having symptoms: sore thyroat, tonsillitis, abscess, etc; as she was always going to test positive, due to the colonization. Explained to the patient that the note also said that the appt on 3/20 would be cancelled, but patient states no one explained why the abx were being stopped, and that no one told her the clinic appt was being cancelled. This SN explained the reasons according to Dr Colby's note, and also instructed that we would be glad to see her in the future, if the need arises. Patient verbalized understanding and thanked this SN for explaining.

## 2018-04-03 RX ORDER — LEVOTHYROXINE SODIUM 75 UG/1
75 TABLET ORAL DAILY
Qty: 90 TABLET | Refills: 2 | Status: SHIPPED | OUTPATIENT
Start: 2018-04-03 | End: 2018-12-14 | Stop reason: SDUPTHER

## 2018-06-05 RX ORDER — ATENOLOL 25 MG/1
25 TABLET ORAL DAILY
Qty: 90 TABLET | Refills: 1 | Status: SHIPPED | OUTPATIENT
Start: 2018-06-05 | End: 2018-11-29 | Stop reason: SDUPTHER

## 2018-06-26 RX ORDER — ESCITALOPRAM OXALATE 20 MG/1
20 TABLET ORAL DAILY
Qty: 90 TABLET | Refills: 3 | Status: SHIPPED | OUTPATIENT
Start: 2018-06-26 | End: 2019-06-18 | Stop reason: SDUPTHER

## 2018-07-09 ENCOUNTER — OFFICE VISIT (OUTPATIENT)
Dept: OPTOMETRY | Facility: CLINIC | Age: 73
End: 2018-07-09
Payer: MEDICARE

## 2018-07-09 DIAGNOSIS — H18.20 CORNEAL EDEMA OF RIGHT EYE: ICD-10-CM

## 2018-07-09 DIAGNOSIS — S05.01XA CORNEA ABRASION, RIGHT, INITIAL ENCOUNTER: Primary | ICD-10-CM

## 2018-07-09 PROCEDURE — 99999 PR PBB SHADOW E&M-EST. PATIENT-LVL II: CPT | Mod: PBBFAC,,, | Performed by: OPTOMETRIST

## 2018-07-09 PROCEDURE — 92004 COMPRE OPH EXAM NEW PT 1/>: CPT | Mod: S$GLB,,, | Performed by: OPTOMETRIST

## 2018-07-09 RX ORDER — MOXIFLOXACIN 5 MG/ML
1 SOLUTION/ DROPS OPHTHALMIC 3 TIMES DAILY
Qty: 1.4 ML | Refills: 0 | Status: SHIPPED | OUTPATIENT
Start: 2018-07-09 | End: 2018-07-16

## 2018-07-09 NOTE — PROGRESS NOTES
"HPI     Blurred Vision    Additional comments: pt states blurred VA - OD only when woke up this   morning -- red, irritated, tearing // flushed w/ cold water last night           Itchy Eye    Additional comments: OU itching x few weeks & pt constantly rubbing eyes   -- used Alaway gtts last night, caused burning & blurred VA           Eye Pain    Additional comments: "eye feels heavy"            Comments   Rubbing eyes OU x "weeks"  Red last night, used alaway   Woke this morning with redness / pain / vision reduced OD only         Last edited by CARLIE Parra, OD on 7/9/2018  2:11 PM. (History)        ROS     Positive for: Eyes    Negative for: Constitutional, Gastrointestinal, Neurological, Skin,   Genitourinary, Musculoskeletal, HENT, Endocrine, Cardiovascular,   Respiratory, Psychiatric, Allergic/Imm, Heme/Lymph    Last edited by CARLIE Parra, OD on 7/9/2018  2:11 PM. (History)        Assessment /Plan     For exam results, see Encounter Report.    Cornea abrasion, right, initial encounter  -     moxifloxacin (VIGAMOX) 0.5 % ophthalmic solution; Place 1 drop into the right eye 3 (three) times daily. for 7 days  Dispense: 1.4 mL; Refill: 0      ***                 "

## 2018-07-09 NOTE — PROGRESS NOTES
"HPI     Blurred Vision    Additional comments: pt states blurred VA - OD only when woke up this   morning -- red, irritated, tearing // flushed w/ cold water last night           Itchy Eye    Additional comments: OU itching x few weeks & pt constantly rubbing eyes   -- used Alaway gtts last night, caused burning & blurred VA           Eye Pain    Additional comments: "eye feels heavy"            Comments   Rubbing eyes OU x "weeks"  Red last night, used alaway   Woke this morning with redness / pain / vision reduced OD only  No previous episode   No sore throat/ fever           Last edited by CARLIE Parra, OD on 7/9/2018  6:59 PM. (History)        ROS     Positive for: Eyes    Negative for: Constitutional, Gastrointestinal, Neurological, Skin,   Genitourinary, Musculoskeletal, HENT, Endocrine, Cardiovascular,   Respiratory, Psychiatric, Allergic/Imm, Heme/Lymph    Last edited by CARLIE Parra, OD on 7/9/2018  2:11 PM. (History)        Assessment /Plan     For exam results, see Encounter Report.    Cornea abrasion, right, initial encounter  -     moxifloxacin (VIGAMOX) 0.5 % ophthalmic solution; Place 1 drop into the right eye 3 (three) times daily. for 7 days  Dispense: 1.4 mL; Refill: 0    Corneal edema of right eye      1,2. Moderate central K abrasion, etiology in question, but fingernail / bedsheets, etc while sleeping  K edema present, unsure duration if RCE or longstanding problem    Considered abx combo --pt with strong rxn to dexmamethasone, will Rx vigamox and bandage lens (+0.50 hydraglyde)  ATs and lubrication  Watch fans / vents  PO ibuprofen 400 q6h while awake  Call w/ report tomorrow, will see if pain is worse, otherwise scheduled f/u in 48 hours at 3 pm                   "

## 2018-07-09 NOTE — PATIENT INSTRUCTIONS
"DRY EYES:  Use Over The Counter artificial tears as needed for dry eye symptoms.  Some common brands include:  Systane, Optive, and Refresh.  These drops can be used as frequently as desired, but may be most helpful use during long periods of concentrated work.  For example, reading / working at the computer.  Avoid drops that "get redness out", as these contain medication that may further irritate the eyes.    ALLERGY EYES / SYMPTOMS:    Over the counter medications include--Zaditor and Alaway  Use as directed 1-2 drops daily for symptoms of itching / watering eyes.  These drops will not help for dry eye or exposure symptoms.      Corneal Abrasion    You have received a scratch or scrape (abrasion) to your cornea. The cornea is the clear part in the front of the eye. This sensitive area is very painful when injured. You may make tears frequently, and your vision may be blurry until the injury heals. You may be sensitive to light.  This part of the body heals quickly. You can expect the pain to go away within 24 to 48 hours. If the abrasion is large or deep, your doctor may apply an eye patch, although this is not always done. An antibiotic ointment or eye drops may also be used to prevent infection.  Numbing drops may be used to relieve the pain temporarily so that your eyes can be examined. However, these drops cannot be prescribed for home use because that would prevent healing and lead to more serious problems. Also, if you cant feel your eye, there is a chance of accidentally injuring it further without knowing it.  Home care  · A cold pack (ice in a plastic bag, wrapped in a towel) may be applied over the eye (or eye patch) for 20 minutes at a time, to reduce pain.  · You may use acetaminophen or ibuprofen to control pain, unless another pain medicine was prescribed. Note: If you have chronic liver or kidney disease or ever had a stomach ulcer or GI bleeding, talk with your doctor before using these " medicines.  · Rest your eyes and do not read until symptoms are gone.  · If you use contact lenses, do not wear them until all symptoms are gone.  · If your vision is affected by the corneal abrasion or if an eye patch was applied, do not drive a motor vehicle or operate machinery until all symptoms are gone. You may have trouble judging distances using only one eye.  · If your eyes are sensitive to light, try wearing sunglasses, or stay indoors until symptoms go away.  Follow-up care  Follow up with your health care provider, or as advised.  · If no patch was put on your eye, and used but the pain continues for more than 48 hours, you should have another exam. Return to this facility or contact your health care provider to arrange this.  · If your eye was patched and you were asked to remove the patch yourself, see your health care provider. You may also return to this facility if you still have pain after the patch is removed.  · If you were given a return appointment for patch removal and re-examination, be sure to keep the appointment. Leaving the patch in place longer than advised could be harmful.  When to seek medical advice  Call your health care provider right away if any of these occur.  · Increasing eye pain or pain that does not improve after 24 hours  · Discharge from the eye  · Increasing redness of the eye or swelling of the eyelids  · Worsening vision  · Symptoms that worsen after the abrasion has healed  Date Last Reviewed: 6/14/2015  © 4308-5161 [employer]. [address] [place] [address], [place] [address]. All rights reserved. This information is not intended as a substitute for professional medical care. Always follow your healthcare professional's instructions.

## 2018-07-11 ENCOUNTER — OFFICE VISIT (OUTPATIENT)
Dept: OPTOMETRY | Facility: CLINIC | Age: 73
End: 2018-07-11
Payer: MEDICARE

## 2018-07-11 DIAGNOSIS — H18.20 CORNEAL EDEMA OF RIGHT EYE: ICD-10-CM

## 2018-07-11 DIAGNOSIS — S05.01XA CORNEA ABRASION, RIGHT, INITIAL ENCOUNTER: Primary | ICD-10-CM

## 2018-07-11 PROCEDURE — 92012 INTRM OPH EXAM EST PATIENT: CPT | Mod: S$GLB,,, | Performed by: OPTOMETRIST

## 2018-07-11 PROCEDURE — 99999 PR PBB SHADOW E&M-EST. PATIENT-LVL III: CPT | Mod: PBBFAC,,, | Performed by: OPTOMETRIST

## 2018-07-11 NOTE — PATIENT INSTRUCTIONS
"Continue vigamox 3x/ day through the weekend    Use artificial tears every 2-3 hours while awake through the weekend        DRY EYES:  Use Over The Counter artificial tears as needed for dry eye symptoms.  Some common brands include:  Systane, Optive, and Refresh.  These drops can be used as frequently as desired, but may be most helpful use during long periods of concentrated work.  For example, reading / working at the computer.  Avoid drops that "get redness out", as these contain medication that may further irritate the eyes.    ALLERGY EYES / SYMPTOMS:    Over the counter medications include--Zaditor and Alaway  Use as directed 1-2 drops daily for symptoms of itching / watering eyes.  These drops will not help for dry eye or exposure symptoms.        "

## 2018-07-11 NOTE — PROGRESS NOTES
HPI     Eye Problem    Additional comments: f/u k abrasion OD -- +vigamox tid & bandage cls //   pt states eye doing much better -- decreased pain           Blurred Vision    Additional comments: vision improved but still blurry           Comments   48 hours post K abrasion and bcl  Feels much better, vision still slightly blurred from normal   No pain  Redness much improved  Still w/ lens in place OD         Last edited by CARLIE Parra, OD on 7/11/2018  4:48 PM. (History)        ROS     Positive for: Eyes    Negative for: Constitutional, Gastrointestinal, Neurological, Skin,   Genitourinary, Musculoskeletal, HENT, Endocrine, Cardiovascular,   Respiratory, Psychiatric, Allergic/Imm, Heme/Lymph    Last edited by CARLIE Parra, OD on 7/11/2018  4:48 PM. (History)        Assessment /Plan     For exam results, see Encounter Report.    Cornea abrasion, right, initial encounter    Corneal edema of right eye      Resolving OD  Removed bcl  Mild spk remains   1+ edema centrally     No AC    Continue vigamox tid through Sunday  ATs q2-3h while awake also through Sunday    Call/ message 24-48 hours if any worsening pain / vision

## 2018-07-23 RX ORDER — FENOFIBRATE 160 MG/1
160 TABLET ORAL DAILY
Qty: 90 TABLET | Refills: 1 | Status: SHIPPED | OUTPATIENT
Start: 2018-07-23 | End: 2019-01-24 | Stop reason: SDUPTHER

## 2018-11-29 DIAGNOSIS — Z12.31 ENCOUNTER FOR SCREENING MAMMOGRAM FOR MALIGNANT NEOPLASM OF BREAST: ICD-10-CM

## 2018-11-29 DIAGNOSIS — E78.5 HYPERLIPIDEMIA, UNSPECIFIED HYPERLIPIDEMIA TYPE: Primary | ICD-10-CM

## 2018-11-29 RX ORDER — ATENOLOL 25 MG/1
TABLET ORAL
Qty: 90 TABLET | Refills: 0 | Status: SHIPPED | OUTPATIENT
Start: 2018-11-29 | End: 2019-03-02 | Stop reason: SDUPTHER

## 2018-12-14 DIAGNOSIS — E03.9 HYPOTHYROIDISM, UNSPECIFIED TYPE: Primary | ICD-10-CM

## 2018-12-14 RX ORDER — LEVOTHYROXINE SODIUM 75 UG/1
75 TABLET ORAL DAILY
Qty: 90 TABLET | Refills: 0 | Status: SHIPPED | OUTPATIENT
Start: 2018-12-14 | End: 2019-03-19 | Stop reason: SDUPTHER

## 2018-12-14 NOTE — PROGRESS NOTES
Refill Authorization Note     is requesting a refill authorization.    Brief assessment and rationale for refill: APPROVE; prr  Amount/Quantity of medication ordered: 90d        Refills Authorized: Yes  If authorized number of refills: 0           Medication Therapy Plan: Thyroid-stable, lco(02/9/18): nov-1/24/19; Approve 3 more months   Name and strength of medication: levothyroxine (SYNTHROID) 75 MCG tablet  How patient will take medication: t1t po qd  Medication reconciliation completed: No        Comments:   Last Thyroid (12 months or within 3 months of dosage adjustment)  Lab Results   Component Value Date    TSH 1.349 02/01/2018    TSH 4.269 (H) 11/30/2017    TSH 3.657 05/27/2016    FREET4 1.01 11/30/2017

## 2019-01-17 ENCOUNTER — LAB VISIT (OUTPATIENT)
Dept: LAB | Facility: HOSPITAL | Age: 74
End: 2019-01-17
Attending: FAMILY MEDICINE
Payer: MEDICARE

## 2019-01-17 DIAGNOSIS — E78.5 HYPERLIPIDEMIA, UNSPECIFIED HYPERLIPIDEMIA TYPE: ICD-10-CM

## 2019-01-17 LAB
ALBUMIN SERPL BCP-MCNC: 3.7 G/DL
ALP SERPL-CCNC: 46 U/L
ALT SERPL W/O P-5'-P-CCNC: 22 U/L
ANION GAP SERPL CALC-SCNC: 9 MMOL/L
AST SERPL-CCNC: 26 U/L
BILIRUB SERPL-MCNC: 0.4 MG/DL
BUN SERPL-MCNC: 21 MG/DL
CALCIUM SERPL-MCNC: 10.2 MG/DL
CHLORIDE SERPL-SCNC: 104 MMOL/L
CHOLEST SERPL-MCNC: 186 MG/DL
CHOLEST/HDLC SERPL: 4.1 {RATIO}
CO2 SERPL-SCNC: 27 MMOL/L
CREAT SERPL-MCNC: 1 MG/DL
EST. GFR  (AFRICAN AMERICAN): >60 ML/MIN/1.73 M^2
EST. GFR  (NON AFRICAN AMERICAN): 56 ML/MIN/1.73 M^2
GLUCOSE SERPL-MCNC: 98 MG/DL
HDLC SERPL-MCNC: 45 MG/DL
HDLC SERPL: 24.2 %
LDLC SERPL CALC-MCNC: 119 MG/DL
NONHDLC SERPL-MCNC: 141 MG/DL
POTASSIUM SERPL-SCNC: 4.3 MMOL/L
PROT SERPL-MCNC: 7.3 G/DL
SODIUM SERPL-SCNC: 140 MMOL/L
TRIGL SERPL-MCNC: 110 MG/DL

## 2019-01-17 PROCEDURE — 36415 COLL VENOUS BLD VENIPUNCTURE: CPT | Mod: PO

## 2019-01-17 PROCEDURE — 80061 LIPID PANEL: CPT

## 2019-01-17 PROCEDURE — 80053 COMPREHEN METABOLIC PANEL: CPT

## 2019-01-24 ENCOUNTER — OFFICE VISIT (OUTPATIENT)
Dept: FAMILY MEDICINE | Facility: CLINIC | Age: 74
End: 2019-01-24
Payer: MEDICARE

## 2019-01-24 ENCOUNTER — HOSPITAL ENCOUNTER (OUTPATIENT)
Dept: RADIOLOGY | Facility: HOSPITAL | Age: 74
Discharge: HOME | End: 2019-01-24
Attending: NURSE PRACTITIONER
Payer: MEDICARE

## 2019-01-24 ENCOUNTER — HOSPITAL ENCOUNTER (OUTPATIENT)
Dept: RADIOLOGY | Facility: HOSPITAL | Age: 74
Discharge: HOME | End: 2019-01-24
Attending: FAMILY MEDICINE
Payer: MEDICARE

## 2019-01-24 VITALS
BODY MASS INDEX: 24.98 KG/M2 | SYSTOLIC BLOOD PRESSURE: 106 MMHG | WEIGHT: 145.5 LBS | OXYGEN SATURATION: 97 % | HEART RATE: 76 BPM | DIASTOLIC BLOOD PRESSURE: 42 MMHG

## 2019-01-24 DIAGNOSIS — F41.9 ANXIETY: ICD-10-CM

## 2019-01-24 DIAGNOSIS — M25.561 CHRONIC PAIN OF BOTH KNEES: ICD-10-CM

## 2019-01-24 DIAGNOSIS — E03.9 HYPOTHYROIDISM, UNSPECIFIED TYPE: ICD-10-CM

## 2019-01-24 DIAGNOSIS — M25.562 CHRONIC PAIN OF BOTH KNEES: ICD-10-CM

## 2019-01-24 DIAGNOSIS — G89.29 CHRONIC PAIN OF BOTH KNEES: ICD-10-CM

## 2019-01-24 DIAGNOSIS — I10 ESSENTIAL HYPERTENSION: ICD-10-CM

## 2019-01-24 DIAGNOSIS — Z12.31 ENCOUNTER FOR SCREENING MAMMOGRAM FOR MALIGNANT NEOPLASM OF BREAST: ICD-10-CM

## 2019-01-24 DIAGNOSIS — B00.1 RECURRENT COLD SORES: ICD-10-CM

## 2019-01-24 DIAGNOSIS — E78.5 HYPERLIPIDEMIA, UNSPECIFIED HYPERLIPIDEMIA TYPE: ICD-10-CM

## 2019-01-24 DIAGNOSIS — Z00.00 ROUTINE MEDICAL EXAM: Primary | ICD-10-CM

## 2019-01-24 PROCEDURE — 3078F PR MOST RECENT DIASTOLIC BLOOD PRESSURE < 80 MM HG: ICD-10-PCS | Mod: CPTII,S$GLB,, | Performed by: FAMILY MEDICINE

## 2019-01-24 PROCEDURE — 77063 BREAST TOMOSYNTHESIS BI: CPT | Mod: 26,,, | Performed by: RADIOLOGY

## 2019-01-24 PROCEDURE — 99999 PR PBB SHADOW E&M-EST. PATIENT-LVL III: CPT | Mod: PBBFAC,,, | Performed by: FAMILY MEDICINE

## 2019-01-24 PROCEDURE — 99499 RISK ADDL DX/OHS AUDIT: ICD-10-PCS | Mod: S$GLB,,, | Performed by: FAMILY MEDICINE

## 2019-01-24 PROCEDURE — 99499 UNLISTED E&M SERVICE: CPT | Mod: S$GLB,,, | Performed by: FAMILY MEDICINE

## 2019-01-24 PROCEDURE — 99999 PR PBB SHADOW E&M-EST. PATIENT-LVL III: ICD-10-PCS | Mod: PBBFAC,,, | Performed by: FAMILY MEDICINE

## 2019-01-24 PROCEDURE — 77067 SCR MAMMO BI INCL CAD: CPT | Mod: 26,,, | Performed by: RADIOLOGY

## 2019-01-24 PROCEDURE — 3078F DIAST BP <80 MM HG: CPT | Mod: CPTII,S$GLB,, | Performed by: FAMILY MEDICINE

## 2019-01-24 PROCEDURE — 99397 PR PREVENTIVE VISIT,EST,65 & OVER: ICD-10-PCS | Mod: S$GLB,,, | Performed by: FAMILY MEDICINE

## 2019-01-24 PROCEDURE — 77067 MAMMO DIGITAL SCREENING BILAT WITH TOMOSYNTHESIS_CAD: ICD-10-PCS | Mod: 26,,, | Performed by: RADIOLOGY

## 2019-01-24 PROCEDURE — 3074F PR MOST RECENT SYSTOLIC BLOOD PRESSURE < 130 MM HG: ICD-10-PCS | Mod: CPTII,S$GLB,, | Performed by: FAMILY MEDICINE

## 2019-01-24 PROCEDURE — 77067 SCR MAMMO BI INCL CAD: CPT | Mod: TC,PO

## 2019-01-24 PROCEDURE — 73564 X-RAY EXAM KNEE 4 OR MORE: CPT | Mod: 26,50,, | Performed by: RADIOLOGY

## 2019-01-24 PROCEDURE — 73564 X-RAY EXAM KNEE 4 OR MORE: CPT | Mod: TC,50,FY,PO

## 2019-01-24 PROCEDURE — 73564 XR KNEE ORTHO BILAT WITH FLEXION: ICD-10-PCS | Mod: 26,50,, | Performed by: RADIOLOGY

## 2019-01-24 PROCEDURE — 77063 MAMMO DIGITAL SCREENING BILAT WITH TOMOSYNTHESIS_CAD: ICD-10-PCS | Mod: 26,,, | Performed by: RADIOLOGY

## 2019-01-24 PROCEDURE — 99397 PER PM REEVAL EST PAT 65+ YR: CPT | Mod: S$GLB,,, | Performed by: FAMILY MEDICINE

## 2019-01-24 PROCEDURE — 3074F SYST BP LT 130 MM HG: CPT | Mod: CPTII,S$GLB,, | Performed by: FAMILY MEDICINE

## 2019-01-24 RX ORDER — FENOFIBRATE 160 MG/1
160 TABLET ORAL DAILY
Qty: 90 TABLET | Refills: 1 | Status: SHIPPED | OUTPATIENT
Start: 2019-01-24 | End: 2019-06-23 | Stop reason: SDUPTHER

## 2019-01-24 RX ORDER — ACYCLOVIR 50 MG/G
OINTMENT TOPICAL
Qty: 15 G | Refills: 2 | Status: SHIPPED | OUTPATIENT
Start: 2019-01-24

## 2019-01-24 NOTE — PROGRESS NOTES
Subjective:       Patient ID: Odette Hewitt is a 73 y.o. female.    Chief Complaint: Follow-up    HPI       Reviewed recent results. All wnl.      htn stable.      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      Anxiety stable.      Reports intermittent cold sores 4-6 x per year. Requesting acyclovir ointment.      Reports soreness in knees with left > right for more than 1 year.  Pain is triggered by going up and down the stairs as well as with prolonged walking.          Review of Systems      Review of Systems   Constitutional: Negative for fever and chills.   HENT: Negative for hearing loss and neck stiffness.    Eyes: Negative for redness and itching.   Respiratory: Negative for cough and choking.    Cardiovascular: Negative for chest pain and leg swelling.  Abdomen: Negative for abdominal pain and blood in stool.   Genitourinary: Negative for dysuria and flank pain.   Musculoskeletal: Negative for back pain   Neurological: Negative for light-headedness and headaches.   Hematological: Negative for adenopathy.   Psychiatric/Behavioral: Negative for behavioral problems.       Objective:      Physical Exam   Constitutional: She is oriented to person, place, and time. She appears well-developed. No distress.   HENT:   Head: Normocephalic.   Mouth/Throat: Oropharynx is clear and moist.   Eyes: Pupils are equal, round, and reactive to light.   Neck: Normal range of motion. Neck supple. No thyromegaly present.   Cardiovascular: Normal rate, regular rhythm and normal heart sounds. Exam reveals no friction rub.   No murmur heard.  Pulmonary/Chest: Breath sounds normal. She has no wheezes. She has no rales.   Abdominal: Soft. Bowel sounds are normal. She exhibits no distension and no mass. There is no tenderness.   Musculoskeletal: Normal range of motion. She exhibits no edema or tenderness.   bilat knees: creps with good rom.    Neurological: She is alert and oriented to person, place, and time. She has normal reflexes. No cranial  nerve deficit.   Skin: Skin is warm. No rash noted. No erythema.   Psychiatric: She has a normal mood and affect. Thought content normal.       Assessment:       1. Routine medical exam    2. Chronic pain of both knees    3. Essential hypertension    4. Anxiety    5. Hypothyroidism, unspecified type    6. Hyperlipidemia, unspecified hyperlipidemia type    7. Recurrent cold sores        Plan:       Routine medical exam    Chronic pain of both knees  -     X-ray Knee Ortho Bilateral with Flexion; Future; Expected date: 01/24/2019  -     Ambulatory Referral to Physical/Occupational Therapy    Essential hypertension    Anxiety    Hypothyroidism, unspecified type    Hyperlipidemia, unspecified hyperlipidemia type    Recurrent cold sores    Other orders  -     acyclovir 5% (ZOVIRAX) 5 % ointment; Apply topically 5 (five) times daily.  Dispense: 15 g; Refill: 2          Plan:  See order and referral above  rx zovirax to take prn  Cont all other meds           Medication List           Accurate as of 1/24/19  9:29 AM. If you have any questions, ask your nurse or doctor.               START taking these medications    acyclovir 5% 5 % ointment  Commonly known as:  ZOVIRAX  Apply topically 5 (five) times daily.  Started by:  Juancho Benítez MD        CHANGE how you take these medications    hydrocortisone 2.5 % cream  Apply topically 2 (two) times daily. To small area, thin coat only, smallest area needed  What changed:    · when to take this  · reasons to take this  · additional instructions        CONTINUE taking these medications    atenolol 25 MG tablet  Commonly known as:  TENORMIN  TAKE 1 TABLET BY MOUTH EVERY DAY     calcipotriene-betamethasone ointment  Commonly known as:  TACLONEX  Apply topically once daily.     escitalopram oxalate 20 MG tablet  Commonly known as:  LEXAPRO  TAKE 1 TABLET (20 MG TOTAL) BY MOUTH ONCE DAILY.     fenofibrate 160 MG Tab  TAKE 1 TABLET (160 MG TOTAL) BY MOUTH ONCE DAILY.     fish  oil-omega-3 fatty acids 300-1,000 mg capsule     levothyroxine 75 MCG tablet  Commonly known as:  SYNTHROID  Take 1 tablet (75 mcg total) by mouth once daily.     nortriptyline 25 MG capsule  Commonly known as:  PAMELOR  TAKE (1) TABLET BY MOUTH EACH EVENING           Where to Get Your Medications      These medications were sent to [place] [address] [facility] [place] - [address]  [id number] [address]    Phone:  [phone]   · acyclovir 5% 5 % ointment  · fenofibrate 160 MG Tab

## 2019-02-05 NOTE — PROGRESS NOTES
inform pt via phone that I reviewed the test results and note the following:    Xray shows arthritis of the knees.

## 2019-02-08 ENCOUNTER — CLINICAL SUPPORT (OUTPATIENT)
Dept: REHABILITATION | Facility: HOSPITAL | Age: 74
End: 2019-02-08
Attending: FAMILY MEDICINE
Payer: MEDICARE

## 2019-02-08 DIAGNOSIS — R26.81 GAIT INSTABILITY: ICD-10-CM

## 2019-02-08 DIAGNOSIS — R29.898 WEAKNESS OF LEFT HIP: ICD-10-CM

## 2019-02-08 PROCEDURE — 97110 THERAPEUTIC EXERCISES: CPT | Mod: PO

## 2019-02-08 PROCEDURE — G8978 MOBILITY CURRENT STATUS: HCPCS | Mod: CK,PO

## 2019-02-08 PROCEDURE — 97161 PT EVAL LOW COMPLEX 20 MIN: CPT | Mod: PO

## 2019-02-08 PROCEDURE — G8979 MOBILITY GOAL STATUS: HCPCS | Mod: CK,PO

## 2019-02-08 NOTE — PLAN OF CARE
"[facility]  Physical Therapy Initial Evaluation    Name: Odette Hewitt  Clinic Number: [id number]    Therapy Diagnosis:   Encounter Diagnoses   Name Primary?    Weakness of left hip     Gait instability      Physician: Juancho Benítez MD    Physician Orders: PT Eval and Treat   Medical Diagnosis from Referral: Chronic pain of both knees   Evaluation Date: 2/8/2019  Authorization Period Expiration: 03/31/2019  Plan of Care Expiration: 04/05/2019  Visit # / Visits authorized: 1/ 12    Time In: 1130  Time Out: 1220  Total Billable Time: 50 minutes    Precautions: Standard    Subjective   Date of Onset: Years ago   History of current condition - Odette reports: She has been having pain in both knees with the L worse than R. She reports when she walks long distances or ascends/descends stairs she has increased pain. She states she avoids stairs due to pain. She reports no falls in the past year with a few near falls in the past year. "I am not sure why the doctor ordered physical therapy."     Medical History:   Past Medical History:   Diagnosis Date    Chronic arm pain     on pamelor    HTN (hypertension)     Hyperlipidemia     Hypothyroid 2006    Osteopenia     Refusal of blood transfusions as patient is Hinduism        Surgical History:   Odette Hewitt  has a past surgical history that includes Hysterectomy and Tympanostomy tube placement.    Medications:   Odette has a current medication list which includes the following prescription(s): acyclovir 5%, atenolol, calcipotriene-betamethasone, escitalopram oxalate, fenofibrate, fish oil-omega-3 fatty acids, hydrocortisone, levothyroxine, and nortriptyline.    Allergies:   Review of patient's allergies indicates:   Allergen Reactions    Dexamethasone      Other reaction(s): Syncope  Other reaction(s): Muscle pain  Other reaction(s): Joint pain    Erythromycin      Other reaction(s): chest pain    Penicillins      " Rash (skin)^    Simvastatin Other (See Comments)     Leg cramps        Imaging, X-ray: Slight cortical irregularity involving the left tibial diaphysis of uncertain etiology, a stress fracture cannot be entirely excluded.  MRI or follow-up for stability may be of use along with clinical correlation.  No priors are available to demonstrate long-term stability.     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   Prior Level of Function: Independent  Current Level of Function: Independent     Pain:  Current 0/10, worst 7/10, best 0/10   Location: bilateral knee   Description: Sore pain  Aggravating Factors: Walking  Easing Factors: rest    Pts goals: Prevent any falls and decrease pain     Objective     Observation: Skin integrity intact    Posture: no significant postural deficits noted       Range of Motion:   Knee Left active Left Passive Right Active R passive   Flexion 135 140 128 132   Extension 0 -3 0 -2     SFMA:  Lumbar flexion: FN  Lumbar extension: FN  Rotation R: FN  Rotation L: RN    VITO: Limited 25% (B) Nonpainful     Lower Extremity Strength  Right LE  Left LE    Knee extension: 5/5 Knee extension: 4+/5   Knee flexion: 5/5 Knee flexion: 5/5   Hip flexion: 4+/5 Hip flexion: 4/5   Hip extension:  5/5 Hip extension: 4+/5   Hip abduction: 4+/5 Hip abduction: 4-/5   Hip adduction: 4+/5 Hip adduction 4+/5   Ankle dorsiflexion: 5/5 Ankle dorsiflexion: 5/5   Ankle plantarflexion: 5/5 Ankle plantarflexion: 5/5       Special Tests:   Left Right   Valgus Stress Test Negative Negative   Varus Stress test Negative Negative   Lachman's test Negative Negative   Posterior Lachman Negative Negative   Stevan's Test Postive Negative   Apley's Compression Negative Negative   Apley's Distraction Negative Negative   Mcpherson's compression test Negative Negative   Thessaly's Test Negative Negative         Function:    - SLS R: 10 seconds, mild sway   - SLS L: 4  seconds, increased sway  - Squat: (B) hip knee pain    - Sit <--> Stand: Independent   - Bed Mobility: Independent      Joint Mobility: Mod Patellar hypomobility on L , good tibiofemoral mobility    Palpation: Mod TTP at L medial knee    Sensation: Intact to Light Touch to B LEs     Flexibility:    Ely's test: R = Moderately limited ; L = Moderately limited    Suraj's test: R = No limitation ; L = Mild limitation   Hamstring: R = mild limitation, L = mild limitation     Edema: No swelling noted      Gait: no significant deviations       CMS Impairment/Limitation/Restriction for FOTO Knee Survey    Therapist reviewed FOTO scores for Odette Hewitt on 2/8/2019.   FOTO documents entered into [employer] - see Media section.    Limitation Score: 48%  Category: Mobility    Current : CK = at least 40% but < 60% impaired, limited or restricted  Goal: CK = at least 40% but < 60% impaired, limited or restricted       TREATMENT   Treatment Time In: 1210  Treatment Time Out: 1220  Total Treatment time separate from Evaluation: 10 minutes    Odette received therapeutic exercises to develop strength, posture and core stabilization for 10 minutes including:  (B) Bridges 2 x 10  (B) SLR 2 x 10  Clamshells GTB 2 x 10     Home Exercises and Patient Education Provided    Education provided:   - Role of PT, PT POC, HEP    Written Home Exercises Provided: yes.  Exercises were reviewed and Odette was able to demonstrate them prior to the end of the session.  Odette demonstrated fair  understanding of the education provided.     See EMR under Media for exercises provided 2/8/2019.    Assessment   Odette is a 73 y.o. female referred to outpatient Physical Therapy with a medical diagnosis of Chronic pain of both knees. Primary impairments include strength, balance, soft tissue restrictions, and pain which limits functional mobility. She demonstrates hip weakness, with the L hip weaker than the R and bilateral hip flexor and quad  tightness. This pt is a good candidate for skilled PT tx and stands to benefit from a combination of manual therapy including joint mobilizations with trigger point/myofacscial release, therapeutic exercise to establish core/joint stability, neuromuscular re-education, and modalities Prn. The pt has been educated on their dx/POC and consents to further PT tx.      Pt prognosis is Good.   Pt will benefit from skilled outpatient Physical Therapy to address the deficits stated above and in the chart below, provide pt/family education, and to maximize pt's level of independence.     Plan of care discussed with patient: Yes  Pt's spiritual, cultural and educational needs considered and patient is agreeable to the plan of care and goals as stated below:     Anticipated Barriers for therapy: None    Medical Necessity is demonstrated by the following  History  Co-morbidities and personal factors that may impact the plan of care Co-morbidities:   CAD and HTN    Personal Factors:   lifestyle (takes care of adult daughter)     moderate   Examination  Body Structures and Functions, activity limitations and participation restrictions that may impact the plan of care Body Regions:   lower extremities    Body Systems:    ROM  strength  balance    Participation Restrictions:   Difficulty walking and shopping at [employer]    Activity limitations:   Learning and applying knowledge  no deficits    General Tasks and Commands  no deficits    Communication  no deficits    Mobility  walking    Self care  no deficits    Domestic Life  shopping    Interactions/Relationships  no deficits    Life Areas  no deficits    Community and Social Life  recreation and leisure         low   Clinical Presentation stable and uncomplicated low   Decision Making/ Complexity Score: low     Goals:  Short-Term Goals: 4 weeks  - The patient will be independent with initial home exercise program.  - The patient will increase strength by 1/2 muscle grade to  perform functional mobiltiy with pain < 3/10.    Long-Term Goals: 8 weeks  - The patient will be independent with home exercise program and symptom management.  - The patient will be independent amb with no assistive device on all surfaces for community distances.  - The patient will increase strength by 5/5 to perform functional mobility with pain < 3/10.  - The patient will increase ROM grossly to WFL to perform ambulation with pain < 3/10.  - The patient will be able to ascend/descend 12 stairs with pain < 3/10.         Plan   Plan of care Certification: 2/8/2019 to 04/05/2019..    Outpatient Physical Therapy 1 times weekly for 8 weeks to include the following interventions: Manual Therapy, Moist Heat/ Ice, Neuromuscular Re-ed, Patient Education and Therapeutic Exercise.     Wily Michel, PT

## 2019-02-11 NOTE — PROGRESS NOTES
"  Physical Therapy Daily Treatment Note     Name: Odette Hewitt  Clinic Number: [id number]    Therapy Diagnosis:   Encounter Diagnoses   Name Primary?    Weakness of left hip     Gait instability      Physician: Juancho Benítez MD    Visit Date: 2/13/2019  Physician Orders: PT Eval and Treat   Medical Diagnosis from Referral: Chronic pain of both knees   Evaluation Date: 2/8/2019  Authorization Period Expiration: 03/31/2019  Plan of Care Expiration: 04/05/2019  Visit # / Visits authorized: 2/ 12    Time In: 1348  Time Out: 1431  Total Billable Time: 40 minutes    Precautions: Standard and Fall    Subjective     Pt reports: She has been doing "her fair share of walking" and has not had much pain since then.  She was compliant with home exercise program.  Response to previous treatment: Tolerated tx well  Functional change: Too soon too tell    Pain: 0/10  Location: N/A    Objective     Odette received therapeutic exercises to develop strength, endurance and ROM for 40 minutes including:  (B) Bridges 2 x 10  (B) SLR 2 x 10  Clamshells GTB 2 x 10   Shuttle squats 2 x 10 1 RB  LAQs 2#  HS curls GTB  SL hip abduction 2 x 10 (L), 1 x 9 (R), patient fatigued  Recumbent bike x 5 minutes  Standing gastroc stretch x 3 minutes   Prone hip flexor stretch x 2 min each (perfrormed by PT)  Tandem Stance 2 x 30 seconds    Home Exercises Provided and Patient Education Provided     Education provided:   - Continuing HEP     Written Home Exercises Provided: yes.  Exercises were reviewed and Odette was able to demonstrate them prior to the end of the session.  Odette demonstrated good  understanding of the education provided.     See EMR under Media for exercises provided prior visit.    Assessment     Pt tolerated treatment well with no increase in pain. She fatigues quickly with exercises and needs encouragement to complete exercises. Able to balance with tandem stance with mild to moderate stay with stand by assist. Will " continue to progress exercises as tolerated.    Odette is progressing well towards her goals.   Pt prognosis is Good.     Pt will continue to benefit from skilled outpatient physical therapy to address the deficits listed in the problem list box on initial evaluation, provide pt/family education and to maximize pt's level of independence in the home and community environment.     Pt's spiritual, cultural and educational needs considered and pt agreeable to plan of care and goals.    Anticipated barriers to physical therapy: None    Goals:   Short-Term Goals: 4 weeks  - The patient will be independent with initial home exercise program.  - The patient will increase strength by 1/2 muscle grade to perform functional mobiltiy with pain < 3/10.     Long-Term Goals: 8 weeks  - The patient will be independent with home exercise program and symptom management.  - The patient will be independent amb with no assistive device on all surfaces for community distances.  - The patient will increase strength by 5/5 to perform functional mobility with pain < 3/10.  - The patient will increase ROM grossly to WFL to perform ambulation with pain < 3/10.  - The patient will be able to ascend/descend 12 stairs with pain < 3/10.     Plan     Continue POC     Wily Michel, PT

## 2019-02-13 ENCOUNTER — CLINICAL SUPPORT (OUTPATIENT)
Dept: REHABILITATION | Facility: HOSPITAL | Age: 74
End: 2019-02-13
Attending: FAMILY MEDICINE
Payer: MEDICARE

## 2019-02-13 DIAGNOSIS — R26.81 GAIT INSTABILITY: ICD-10-CM

## 2019-02-13 DIAGNOSIS — R29.898 WEAKNESS OF LEFT HIP: ICD-10-CM

## 2019-02-13 PROCEDURE — 97110 THERAPEUTIC EXERCISES: CPT | Mod: PO

## 2019-03-06 RX ORDER — ATENOLOL 25 MG/1
TABLET ORAL
Qty: 90 TABLET | Refills: 0 | Status: SHIPPED | OUTPATIENT
Start: 2019-03-06 | End: 2019-06-02 | Stop reason: SDUPTHER

## 2019-03-19 DIAGNOSIS — E03.9 HYPOTHYROIDISM, UNSPECIFIED TYPE: ICD-10-CM

## 2019-03-20 RX ORDER — LEVOTHYROXINE SODIUM 75 UG/1
75 TABLET ORAL DAILY
Qty: 90 TABLET | Refills: 0 | Status: SHIPPED | OUTPATIENT
Start: 2019-03-20 | End: 2019-06-19 | Stop reason: SDUPTHER

## 2019-03-20 NOTE — PROGRESS NOTES
Refill Authorization Note     is requesting a refill authorization.    Brief assessment and rationale for refill: APPROVE: Needs Labs   Name and strength of medication: levothyroxine (SYNTHROID) 75 MCG tablet       Medication Therapy Plan: THYROID-lco(lov); NTBO(TSH); Approve 3 more months     Medication reconciliation completed: No              How patient will take medication: t1t po qd          Comments:   Last Thyroid (12 months or within 3 months of dosage adjustment)  Lab Results   Component Value Date    TSH 1.349 02/01/2018    TSH 4.269 (H) 11/30/2017    TSH 3.657 05/27/2016    Lab Results   Component Value Date    FREET4 1.01 11/30/2017        APPOINTMENTS (past 12 m or future 3m authorizing provider)  LAST VISIT DATE  Juancho Benítez MD 1/24/2019         NEXT VISIT DATE  Juancho Benítez MD Visit date not found

## 2019-03-27 ENCOUNTER — DOCUMENTATION ONLY (OUTPATIENT)
Dept: REHABILITATION | Facility: HOSPITAL | Age: 74
End: 2019-03-27

## 2019-03-27 PROBLEM — R29.898 WEAKNESS OF LEFT HIP: Status: RESOLVED | Noted: 2019-02-08 | Resolved: 2019-03-27

## 2019-03-27 PROBLEM — R26.81 GAIT INSTABILITY: Status: RESOLVED | Noted: 2019-02-08 | Resolved: 2019-03-27

## 2019-03-27 NOTE — PROGRESS NOTES
Outpatient Therapy Discharge Summary     Name: Odette Hewitt  Clinic Number: [id number]    Therapy Diagnosis: Weakness of left hip; Gait instability  Physician: Juancho Benítez MD    Physician Orders: PT Eval and Treat   Medical Diagnosis from Referral: Chronic pain of both knees   Evaluation Date: 2/8/2019    Date of Last visit: 02/13/2019  Total Visits Received: 2  Cancelled Visits: 6  No Show Visits: 0    Assessment    Goals:   Short-Term Goals: 4 weeks  - The patient will be independent with initial home exercise program.  - The patient will increase strength by 1/2 muscle grade to perform functional mobiltiy with pain < 3/10.     Long-Term Goals: 8 weeks  - The patient will be independent with home exercise program and symptom management.  - The patient will be independent amb with no assistive device on all surfaces for community distances.  - The patient will increase strength by 5/5 to perform functional mobility with pain < 3/10.  - The patient will increase ROM grossly to WFL to perform ambulation with pain < 3/10.  - The patient will be able to ascend/descend 12 stairs with pain < 3/10.       Discharge reason: Patient has not attended therapy since 02/13/2019    Plan   This patient is discharged from Physical Therapy    Wily Michel, PT  03/27/2019

## 2019-04-20 RX ORDER — NORTRIPTYLINE HYDROCHLORIDE 25 MG/1
CAPSULE ORAL
Qty: 90 CAPSULE | Refills: 3 | Status: SHIPPED | OUTPATIENT
Start: 2019-04-20 | End: 2020-02-07 | Stop reason: SDUPTHER

## 2019-06-02 RX ORDER — ATENOLOL 25 MG/1
TABLET ORAL
Qty: 90 TABLET | Refills: 0 | Status: SHIPPED | OUTPATIENT
Start: 2019-06-02 | End: 2019-09-11 | Stop reason: SDUPTHER

## 2019-06-19 DIAGNOSIS — E03.9 HYPOTHYROIDISM, UNSPECIFIED TYPE: ICD-10-CM

## 2019-06-20 RX ORDER — LEVOTHYROXINE SODIUM 75 UG/1
75 TABLET ORAL DAILY
Qty: 90 TABLET | Refills: 0 | Status: SHIPPED | OUTPATIENT
Start: 2019-06-20 | End: 2019-10-12 | Stop reason: SDUPTHER

## 2019-06-20 NOTE — PROGRESS NOTES
Refill Authorization Note     is requesting a refill authorization.    Brief assessment and rationale for refill: APPROVE: needs labs (TSH)  Name and strength of medication: LEVOTHYROXINE 75 MCG TABLET  Medication-related problems identified: Requires labs    Medication Therapy Plan: As above                   How patient will take medication: t1t po daily           Comments:   Lab Results   Component Value Date    TSH 1.349 02/01/2018    TSH 4.269 (H) 11/30/2017    TSH 3.657 05/27/2016    FREET4 1.01 11/30/2017

## 2019-06-21 ENCOUNTER — LAB VISIT (OUTPATIENT)
Dept: LAB | Facility: HOSPITAL | Age: 74
End: 2019-06-21
Attending: FAMILY MEDICINE
Payer: MEDICARE

## 2019-06-21 DIAGNOSIS — E03.9 HYPOTHYROIDISM, UNSPECIFIED TYPE: ICD-10-CM

## 2019-06-21 LAB — TSH SERPL DL<=0.005 MIU/L-ACNC: 1.84 UIU/ML (ref 0.4–4)

## 2019-06-21 PROCEDURE — 84443 ASSAY THYROID STIM HORMONE: CPT

## 2019-06-21 PROCEDURE — 36415 COLL VENOUS BLD VENIPUNCTURE: CPT | Mod: PO

## 2019-06-21 RX ORDER — ESCITALOPRAM OXALATE 20 MG/1
20 TABLET ORAL DAILY
Qty: 90 TABLET | Refills: 3 | Status: SHIPPED | OUTPATIENT
Start: 2019-06-21 | End: 2020-06-10 | Stop reason: SDUPTHER

## 2019-06-23 RX ORDER — FENOFIBRATE 160 MG/1
160 TABLET ORAL DAILY
Qty: 90 TABLET | Refills: 1 | Status: SHIPPED | OUTPATIENT
Start: 2019-06-23 | End: 2020-04-06

## 2019-09-07 DIAGNOSIS — I10 ESSENTIAL HYPERTENSION: Primary | ICD-10-CM

## 2019-09-09 NOTE — PROGRESS NOTES
Refill Authorization Note     is requesting a refill authorization.    Brief assessment and rationale for refill: DEFER: low BP  Name and strength of medication:  ATENOLOL 25 MG TABLET       Medication Therapy Plan: LCO/LOV(01/19) HTN stable continue meds; pulse WNL(01/19); pt BP low upon last check (01/19); DEFER to you                   How patient will take medication: t1t po daily          Comments:   BP Readings from Last 3 Encounters:   01/24/19 (!) 106/42   03/09/18 112/64   02/09/18 120/66     Pulse Readings from Last 3 Encounters:   01/24/19 76   03/09/18 74   02/09/18 70     APPOINTMENTS (past 12m or future 3m authorizing provider)   Date Provider   LAST VISIT  1/24/2019 Juancho Benítez MD   NEXT VISIT  Visit date not found Juancho Benítez MD

## 2019-09-09 NOTE — TELEPHONE ENCOUNTER
Please see the following assessment. This refill request still requires some action on your part. Pt BP low (106/42) upon last check. DEFER to you. Thank you.

## 2019-09-11 ENCOUNTER — TELEPHONE (OUTPATIENT)
Dept: FAMILY MEDICINE | Facility: CLINIC | Age: 74
End: 2019-09-11

## 2019-09-11 RX ORDER — ATENOLOL 25 MG/1
25 TABLET ORAL DAILY
Qty: 90 TABLET | Refills: 3 | Status: SHIPPED | OUTPATIENT
Start: 2019-09-11 | End: 2020-08-26 | Stop reason: SDUPTHER

## 2019-09-11 RX ORDER — ATENOLOL 25 MG/1
TABLET ORAL
Qty: 90 TABLET | Refills: 0 | OUTPATIENT
Start: 2019-09-11

## 2019-09-11 NOTE — TELEPHONE ENCOUNTER
Spoke to pt. Advised medication was sent to the pharmacy. Pt. Verbalized understanding. Phone call ended.

## 2019-09-11 NOTE — TELEPHONE ENCOUNTER
----- Message from Trung Bruno sent at 9/11/2019  8:11 AM CDT -----  Contact: patient   Type:  RX Refill Request    Who Called:  Patient   Refill or New Rx:  Refill   RX Name and Strength:  atenolol (TENORMIN) 25 MG tablet  Preferred Pharmacy with phone number:   [id number] [place] - [address]  [address]  [facility] [address]  Phone: [phone] Fax: [phone]  Best Call Back Number:  [phone] (home) to update asap  Additional Information:  Please fill today, pt is completely out

## 2019-09-11 NOTE — TELEPHONE ENCOUNTER
I have reviewed and agree with the assessment below.  Handled in future encounter; will remove.  Thanks

## 2019-10-12 DIAGNOSIS — E03.9 HYPOTHYROIDISM, UNSPECIFIED TYPE: ICD-10-CM

## 2019-10-14 RX ORDER — LEVOTHYROXINE SODIUM 75 UG/1
75 TABLET ORAL DAILY
Qty: 90 TABLET | Refills: 0 | Status: SHIPPED | OUTPATIENT
Start: 2019-10-14 | End: 2020-01-08

## 2019-10-14 NOTE — PROGRESS NOTES
Refill Authorization Note     is requesting a refill authorization.    Brief assessment and rationale for refill: APPROVE: prr  Name and strength of medication: levothyroxine (SYNTHROID) 75 MCG tablet       Medication Therapy Plan: Hypothyroidism lco(1/19); labs wnl; approve 3 more    Medication reconciliation completed: No              How patient will take medication: t1t po qd          Comments:  Last Thyroid (12 months or within 3 months of dosage adjustment)  Lab Results   Component Value Date    TSH 1.837 06/21/2019    TSH 1.349 02/01/2018    TSH 4.269 (H) 11/30/2017    Lab Results   Component Value Date    FREET4 1.01 11/30/2017        Appointments past 12m or future 3m    Date Provider   Last Visit   1/24/2019 Juancho Benítez MD   Next Visit   Visit date not found Juancho Benítez MD

## 2019-12-23 ENCOUNTER — TELEPHONE (OUTPATIENT)
Dept: FAMILY MEDICINE | Facility: CLINIC | Age: 74
End: 2019-12-23

## 2019-12-23 DIAGNOSIS — Z12.31 ENCOUNTER FOR SCREENING MAMMOGRAM FOR BREAST CANCER: ICD-10-CM

## 2019-12-23 DIAGNOSIS — Z00.00 ANNUAL PHYSICAL EXAM: Primary | ICD-10-CM

## 2019-12-23 DIAGNOSIS — E78.5 HYPERLIPIDEMIA, UNSPECIFIED HYPERLIPIDEMIA TYPE: ICD-10-CM

## 2019-12-23 DIAGNOSIS — E03.9 HYPOTHYROIDISM, UNSPECIFIED TYPE: ICD-10-CM

## 2019-12-23 NOTE — TELEPHONE ENCOUNTER
----- Message from Mattie Clarke sent at 12/23/2019  9:45 AM CST -----  Contact: Patient  Type: Needs Medical Advice    Who Called:  Patient  Best Call Back Number: [phone] (home)   Additional Information: The pt said she needs labs and mammo orders put in before her 01/03/2020 appt

## 2020-01-08 DIAGNOSIS — E03.9 HYPOTHYROIDISM, UNSPECIFIED TYPE: ICD-10-CM

## 2020-01-08 RX ORDER — LEVOTHYROXINE SODIUM 75 UG/1
75 TABLET ORAL DAILY
Qty: 90 TABLET | Refills: 0 | Status: SHIPPED | OUTPATIENT
Start: 2020-01-08 | End: 2020-04-13 | Stop reason: SDUPTHER

## 2020-01-08 NOTE — PROGRESS NOTES
Refill Authorization Note     is requesting a refill authorization.    Brief assessment and rationale for refill: APPROVE: prr          Medication Therapy Plan: TSH wnl                              Comments:   Requested Prescriptions   Pending Prescriptions Disp Refills    levothyroxine (SYNTHROID) 75 MCG tablet [Pharmacy Med Name: LEVOTHYROXINE 75 MCG TABLET] 90 tablet 0     Sig: TAKE 1 TABLET (75 MCG TOTAL) BY MOUTH ONCE DAILY.       Endocrinology:  Hypothyroid Agents Failed - 1/8/2020  2:10 AM        Failed - If TSH was abnormal but FT4 was WNL, okay to refill. FT4 is not required if TSH is WNL.        Failed - T4 free in normal range and within 360 days     Free T4   Date Value Ref Range Status   11/30/2017 1.01 0.71 - 1.51 ng/dL Final              Passed - Patient is at least 18 years old        Passed - Office visit in past 12 months or future 90 days     Recent Outpatient Visits            11 months ago Routine medical exam    [facility] Juancho Benítez MD    1 year ago Cornea abrasion, right, initial encounter    [facility] CARLIE Parra, OD    1 year ago Cornea abrasion, right, initial encounter    [facility] - [facility] Parra, OD    1 year ago Rhinitis, unspecified chronicity, unspecified type    [facility] Rich Bennett NP    1 year ago Hypothyroidism, unspecified type    [facility] Rosa Bennett NP          Future Appointments              In 2 weeks Juancho Benítez MD [facility]                Passed - TSH in normal range and within 360 days     TSH   Date Value Ref Range Status   06/21/2019 1.837 0.400 - 4.000 uIU/mL Final   02/01/2018 1.349 0.400 - 4.000 uIU/mL Final   11/30/2017 4.269 (H) 0.400 - 4.000 uIU/mL Final

## 2020-01-23 ENCOUNTER — LAB VISIT (OUTPATIENT)
Dept: LAB | Facility: HOSPITAL | Age: 75
End: 2020-01-23
Attending: FAMILY MEDICINE
Payer: MEDICARE

## 2020-01-23 ENCOUNTER — OFFICE VISIT (OUTPATIENT)
Dept: FAMILY MEDICINE | Facility: CLINIC | Age: 75
End: 2020-01-23
Payer: MEDICARE

## 2020-01-23 VITALS
SYSTOLIC BLOOD PRESSURE: 128 MMHG | BODY MASS INDEX: 26.2 KG/M2 | HEART RATE: 80 BPM | OXYGEN SATURATION: 96 % | HEIGHT: 64 IN | WEIGHT: 153.44 LBS | DIASTOLIC BLOOD PRESSURE: 76 MMHG

## 2020-01-23 DIAGNOSIS — I10 ESSENTIAL HYPERTENSION: ICD-10-CM

## 2020-01-23 DIAGNOSIS — E78.5 HYPERLIPIDEMIA, UNSPECIFIED HYPERLIPIDEMIA TYPE: ICD-10-CM

## 2020-01-23 DIAGNOSIS — Z00.00 ROUTINE MEDICAL EXAM: Primary | ICD-10-CM

## 2020-01-23 DIAGNOSIS — B00.1 RECURRENT COLD SORES: ICD-10-CM

## 2020-01-23 DIAGNOSIS — E03.9 HYPOTHYROIDISM, UNSPECIFIED TYPE: ICD-10-CM

## 2020-01-23 DIAGNOSIS — F41.9 ANXIETY: ICD-10-CM

## 2020-01-23 DIAGNOSIS — Z00.00 ANNUAL PHYSICAL EXAM: ICD-10-CM

## 2020-01-23 LAB
ALBUMIN SERPL BCP-MCNC: 3.9 G/DL (ref 3.5–5.2)
ALP SERPL-CCNC: 60 U/L (ref 55–135)
ALT SERPL W/O P-5'-P-CCNC: 31 U/L (ref 10–44)
ANION GAP SERPL CALC-SCNC: 7 MMOL/L (ref 8–16)
AST SERPL-CCNC: 29 U/L (ref 10–40)
BILIRUB SERPL-MCNC: 0.3 MG/DL (ref 0.1–1)
BUN SERPL-MCNC: 20 MG/DL (ref 8–23)
CALCIUM SERPL-MCNC: 10.3 MG/DL (ref 8.7–10.5)
CHLORIDE SERPL-SCNC: 105 MMOL/L (ref 95–110)
CHOLEST SERPL-MCNC: 220 MG/DL (ref 120–199)
CHOLEST/HDLC SERPL: 4.8 {RATIO} (ref 2–5)
CO2 SERPL-SCNC: 27 MMOL/L (ref 23–29)
CREAT SERPL-MCNC: 1 MG/DL (ref 0.5–1.4)
EST. GFR  (AFRICAN AMERICAN): >60 ML/MIN/1.73 M^2
EST. GFR  (NON AFRICAN AMERICAN): 55.6 ML/MIN/1.73 M^2
GLUCOSE SERPL-MCNC: 109 MG/DL (ref 70–110)
HDLC SERPL-MCNC: 46 MG/DL (ref 40–75)
HDLC SERPL: 20.9 % (ref 20–50)
LDLC SERPL CALC-MCNC: 132.8 MG/DL (ref 63–159)
NONHDLC SERPL-MCNC: 174 MG/DL
POTASSIUM SERPL-SCNC: 4.2 MMOL/L (ref 3.5–5.1)
PROT SERPL-MCNC: 7.5 G/DL (ref 6–8.4)
SODIUM SERPL-SCNC: 139 MMOL/L (ref 136–145)
TRIGL SERPL-MCNC: 206 MG/DL (ref 30–150)
TSH SERPL DL<=0.005 MIU/L-ACNC: 2.19 UIU/ML (ref 0.4–4)

## 2020-01-23 PROCEDURE — 99499 UNLISTED E&M SERVICE: CPT | Mod: S$GLB,,, | Performed by: FAMILY MEDICINE

## 2020-01-23 PROCEDURE — 99214 PR OFFICE/OUTPT VISIT, EST, LEVL IV, 30-39 MIN: ICD-10-PCS | Mod: S$GLB,,, | Performed by: FAMILY MEDICINE

## 2020-01-23 PROCEDURE — 3078F PR MOST RECENT DIASTOLIC BLOOD PRESSURE < 80 MM HG: ICD-10-PCS | Mod: CPTII,S$GLB,, | Performed by: FAMILY MEDICINE

## 2020-01-23 PROCEDURE — 99999 PR PBB SHADOW E&M-EST. PATIENT-LVL III: ICD-10-PCS | Mod: PBBFAC,,, | Performed by: FAMILY MEDICINE

## 2020-01-23 PROCEDURE — 99999 PR PBB SHADOW E&M-EST. PATIENT-LVL III: CPT | Mod: PBBFAC,,, | Performed by: FAMILY MEDICINE

## 2020-01-23 PROCEDURE — 80053 COMPREHEN METABOLIC PANEL: CPT

## 2020-01-23 PROCEDURE — 99214 OFFICE O/P EST MOD 30 MIN: CPT | Mod: S$GLB,,, | Performed by: FAMILY MEDICINE

## 2020-01-23 PROCEDURE — 84443 ASSAY THYROID STIM HORMONE: CPT

## 2020-01-23 PROCEDURE — 3074F SYST BP LT 130 MM HG: CPT | Mod: CPTII,S$GLB,, | Performed by: FAMILY MEDICINE

## 2020-01-23 PROCEDURE — 80061 LIPID PANEL: CPT

## 2020-01-23 PROCEDURE — 99499 RISK ADDL DX/OHS AUDIT: ICD-10-PCS | Mod: S$GLB,,, | Performed by: FAMILY MEDICINE

## 2020-01-23 PROCEDURE — 3078F DIAST BP <80 MM HG: CPT | Mod: CPTII,S$GLB,, | Performed by: FAMILY MEDICINE

## 2020-01-23 PROCEDURE — 3074F PR MOST RECENT SYSTOLIC BLOOD PRESSURE < 130 MM HG: ICD-10-PCS | Mod: CPTII,S$GLB,, | Performed by: FAMILY MEDICINE

## 2020-01-23 NOTE — PROGRESS NOTES
Subjective:       Patient ID: Odette Hewitt is a 74 y.o. female.    Chief Complaint: Routine medical exam    HPI     Here for a check up.      htn stable.      Hypothyroidism stable.     Anxiety stable.      Reports intermittent cold sores 4-6 x per year. Requesting acyclovir ointment.        Review of Systems      Review of Systems   Constitutional: Negative for fever and chills.   HENT: Negative for hearing loss and neck stiffness.    Eyes: Negative for redness and itching.   Respiratory: Negative for cough and choking.    Cardiovascular: Negative for chest pain and leg swelling.  Abdomen: Negative for abdominal pain and blood in stool.   Genitourinary: Negative for dysuria and flank pain.   Musculoskeletal: Negative for back pain and gait problem.   Neurological: Negative for light-headedness and headaches.   Hematological: Negative for adenopathy.   Psychiatric/Behavioral: Negative for behavioral problems.       Objective:      Physical Exam   Constitutional: She is oriented to person, place, and time. She appears well-developed. No distress.   HENT:   Head: Normocephalic.   Mouth/Throat: Oropharynx is clear and moist.   Eyes: Pupils are equal, round, and reactive to light.   Neck: Normal range of motion. Neck supple. No thyromegaly present.   Cardiovascular: Normal rate, regular rhythm and normal heart sounds. Exam reveals no friction rub.   No murmur heard.  Pulmonary/Chest: Breath sounds normal. She has no wheezes. She has no rales.   Abdominal: Soft. Bowel sounds are normal. She exhibits no distension and no mass. There is no tenderness.   Musculoskeletal: Normal range of motion. She exhibits no edema or tenderness.   Neurological: She is alert and oriented to person, place, and time. She has normal reflexes. No cranial nerve deficit.   Skin: Skin is warm. No rash noted. No erythema.   Psychiatric: She has a normal mood and affect. Thought content normal.       Assessment:       1. Routine medical exam     2. Essential hypertension    3. Anxiety    4. Recurrent cold sores    5. Hypothyroidism, unspecified type        Plan:       Routine medical exam    Essential hypertension    Anxiety    Recurrent cold sores    Hypothyroidism, unspecified type              Plan:  Labs pending today  Cont current meds      Medication List with Changes/Refills   Current Medications    ACYCLOVIR 5% (ZOVIRAX) 5 % OINTMENT    Apply topically 5 (five) times daily.    ATENOLOL (TENORMIN) 25 MG TABLET    Take 1 tablet (25 mg total) by mouth once daily.    CALCIPOTRIENE-BETAMETHASONE (TACLONEX) OINTMENT    Apply topically once daily.    ESCITALOPRAM OXALATE (LEXAPRO) 20 MG TABLET    TAKE 1 TABLET (20 MG TOTAL) BY MOUTH ONCE DAILY.    FENOFIBRATE 160 MG TAB    TAKE 1 TABLET (160 MG TOTAL) BY MOUTH ONCE DAILY.    FISH OIL-OMEGA-3 FATTY ACIDS 300-1,000 MG CAPSULE    Take 2 g by mouth once daily.    HYDROCORTISONE 2.5 % CREAM    Apply topically 2 (two) times daily. To small area, thin coat only, smallest area needed    LEVOTHYROXINE (SYNTHROID) 75 MCG TABLET    TAKE 1 TABLET (75 MCG TOTAL) BY MOUTH ONCE DAILY.    NORTRIPTYLINE (PAMELOR) 25 MG CAPSULE    TAKE (1) TABLET BY MOUTH EACH EVENING    PAZEO 0.7 % DROP    INSTILL 1 DROP INTO BOTH EYES ONCE A DAY FOR ITCHY EYES

## 2020-02-07 NOTE — TELEPHONE ENCOUNTER
----- Message from Oliverio Olmstead sent at 2/7/2020  9:47 AM CST -----  Contact: Patient  Type:  RX Refill Request    Who Called:  Patient  Refill or New Rx:  Refill  RX Name and Strength:  nortriptyline (PAMELOR) 25 MG capsule  How is the patient currently taking it? (ex. 1XDay):  As ordered  Is this a 30 day or 90 day RX:  90  Preferred Pharmacy with phone number:    [facility] - [address]  [address]  [facility] [address]  Phone: [phone] Fax: [phone]  Local or Mail Order:  Mail Order  Ordering Provider:  Dr. Jackelin Newell Call Back Number:  [phone]  Additional Information:  MOOSE

## 2020-02-10 RX ORDER — NORTRIPTYLINE HYDROCHLORIDE 25 MG/1
CAPSULE ORAL
Qty: 90 CAPSULE | Refills: 3 | Status: SHIPPED | OUTPATIENT
Start: 2020-02-10 | End: 2021-01-28 | Stop reason: SDUPTHER

## 2020-02-27 ENCOUNTER — HOSPITAL ENCOUNTER (OUTPATIENT)
Dept: RADIOLOGY | Facility: HOSPITAL | Age: 75
Discharge: HOME | End: 2020-02-27
Attending: FAMILY MEDICINE
Payer: MEDICARE

## 2020-02-27 DIAGNOSIS — Z12.31 ENCOUNTER FOR SCREENING MAMMOGRAM FOR BREAST CANCER: ICD-10-CM

## 2020-02-27 PROCEDURE — 77063 MAMMO DIGITAL SCREENING BILAT WITH TOMOSYNTHESIS_CAD: ICD-10-PCS | Mod: 26,,, | Performed by: RADIOLOGY

## 2020-02-27 PROCEDURE — 77063 BREAST TOMOSYNTHESIS BI: CPT | Mod: 26,,, | Performed by: RADIOLOGY

## 2020-02-27 PROCEDURE — 77067 SCR MAMMO BI INCL CAD: CPT | Mod: 26,,, | Performed by: RADIOLOGY

## 2020-02-27 PROCEDURE — 77067 SCR MAMMO BI INCL CAD: CPT | Mod: TC,PO

## 2020-02-27 PROCEDURE — 77067 MAMMO DIGITAL SCREENING BILAT WITH TOMOSYNTHESIS_CAD: ICD-10-PCS | Mod: 26,,, | Performed by: RADIOLOGY

## 2020-04-06 RX ORDER — FENOFIBRATE 160 MG/1
160 TABLET ORAL DAILY
Qty: 90 TABLET | Refills: 1 | Status: SHIPPED | OUTPATIENT
Start: 2020-04-06 | End: 2020-11-23 | Stop reason: SDUPTHER

## 2020-04-10 DIAGNOSIS — E03.9 HYPOTHYROIDISM, UNSPECIFIED TYPE: ICD-10-CM

## 2020-04-13 DIAGNOSIS — E03.9 HYPOTHYROIDISM, UNSPECIFIED TYPE: ICD-10-CM

## 2020-04-13 RX ORDER — LEVOTHYROXINE SODIUM 75 UG/1
75 TABLET ORAL DAILY
Qty: 90 TABLET | Refills: 3 | Status: SHIPPED | OUTPATIENT
Start: 2020-04-13 | End: 2021-03-24

## 2020-04-13 NOTE — TELEPHONE ENCOUNTER
----- Message from Nery Valenzuela sent at 4/13/2020  4:29 PM CDT -----  Contact: Patient  Type:  RX Refill Request    Who Called:  Patient  Refill or New Rx:  Refill  RX Name and Strength:  levothyroxine (SYNTHROID) 75 MCG tablet  How is the patient currently taking it? (ex. 1XDay): 1XDay  Is this a 30 day or 90 day RX:  90  Preferred Pharmacy with phone number:  [id number] [place] [address] [phone] (Phone)   Local or Mail Order:  Local  Ordering Provider:  Jackelin Newell Call Back Number:  [phone]  Additional Information:  Patient has ONE left. Says she has been trying to get this refilled- pharmacy has also sent over request

## 2020-04-15 RX ORDER — LEVOTHYROXINE SODIUM 75 UG/1
TABLET ORAL
Qty: 90 TABLET | Refills: 0 | OUTPATIENT
Start: 2020-04-15

## 2020-04-15 NOTE — PROGRESS NOTES
Refill Authorization Note     is requesting a refill authorization.    Brief assessment and rationale for refill: QUICK DC: duplicate request                                          Comments:   Pended Medication(s)   Requested Prescriptions     Pending Prescriptions Disp Refills    levothyroxine (SYNTHROID) 75 MCG tablet [Pharmacy Med Name: LEVOTHYROXINE 75 MCG TABLET] 90 tablet 0     Sig: TAKE 1 TABLET BY MOUTH EVERY DAY        Duplicate Pended Encounter(s)/ Last Prescribed Details:    levothyroxine (SYNTHROID) 75 MCG tablet 90 tablet 3 4/13/2020     Sig - Route: Take 1 tablet (75 mcg total) by mouth once daily. - Oral    Sent to pharmacy as: levothyroxine (SYNTHROID) 75 MCG tablet    E-Prescribing Status: Receipt confirmed by pharmacy (4/13/2020  5:03 PM CDT)    Ordering Encounter Report     Associated Reports   View Encounter                  Note composed:12:47 PM 04/15/2020

## 2020-06-10 RX ORDER — ESCITALOPRAM OXALATE 20 MG/1
20 TABLET ORAL DAILY
Qty: 90 TABLET | Refills: 2 | Status: SHIPPED | OUTPATIENT
Start: 2020-06-10 | End: 2020-06-12

## 2020-06-10 NOTE — PROGRESS NOTES
Refill Authorization Note    is requesting a refill authorization.    Brief assessment and rationale for refill: APPROVE: prr               Medication reconciliation completed: No                         Comments:      Requested Prescriptions   Signed Prescriptions Disp Refills    escitalopram oxalate (LEXAPRO) 20 MG tablet 90 tablet 2     Sig: Take 1 tablet (20 mg total) by mouth once daily.       Psychiatry:  Antidepressants - SSRI Passed - 6/10/2020 12:01 PM        Passed - Patient is at least 18 years old        Passed - Office visit in past 6 months or future 90 days.     Recent Outpatient Visits            4 months ago Routine medical exam    [place] [facility] Juancho Benítez MD    1 year ago Routine medical exam    [facility] Juancho Benítez MD    1 year ago Cornea abrasion, right, initial encounter    [place] - Opt[facility] CARLIE Parra, OD    1 year ago Cornea abrasion, right, initial encounter    [place] - Optdeepali Parra, OD    2 years ago Rhinitis, unspecified chronicity, unspecified type    [facility] Rosa Bennett, ZEINA                     Appointments  past 12m or future 3m with PCP    Date Provider   Last Visit   1/23/2020 Juancho Benítez MD   Next Visit   Visit date not found Juancho Benítez MD   ED visits in past 90 days: 0     Note composed:1:38 PM 06/10/2020

## 2020-06-10 NOTE — TELEPHONE ENCOUNTER
----- Message from Elizabeth Garcia sent at 6/10/2020 11:33 AM CDT -----  Contact: pt  Type:  RX Refill Request    Who Called:  pt  Refill or New Rx:Refill  RX Name and Strength:  escitalopram oxalate (LEXAPRO) 20 MG tablet  How is the patient currently taking it? (ex. 1XDay):  1xday  Is this a 30 day or 90 day RX:  90  Preferred Pharmacy with phone number:   [id number] [place], [place] - [address] [address]   [address]  [facility] [address]  Phone: [phone] Fax: [phone]    [facility] - [address]  [facility] [address]  Phone: [phone] Fax: [phone]    Local or Mail Order: local  Ordering Provider:  Jackelin Newell Call Back Number:  [phone]  Additional Information:

## 2020-06-10 NOTE — TELEPHONE ENCOUNTER
----- Message from Elizabeth Garcia sent at 6/10/2020 11:33 AM CDT -----  Contact: pt  Type:  RX Refill Request    Who Called:  pt  Refill or New Rx:Refill  RX Name and Strength:  escitalopram oxalate (LEXAPRO) 20 MG tablet  How is the patient currently taking it? (ex. 1XDay):  1xday  Is this a 30 day or 90 day RX:  90  Preferred Pharmacy with phone number:   [id number] [place], [place] - [address] [address]   [address]  [facility] [address]  Phone: [phone] Fax: [phone]    [facility] - [address]  [facility] [address]  Phone: [phone] Fax: [phone]    Local or Mail Order: local  Ordering Provider:  Jackelin Newell Call Back Number:  [phone]  Additional Information:

## 2020-06-11 RX ORDER — ESCITALOPRAM OXALATE 20 MG/1
20 TABLET ORAL DAILY
Qty: 90 TABLET | Refills: 3 | OUTPATIENT
Start: 2020-06-11

## 2020-06-12 ENCOUNTER — TELEPHONE (OUTPATIENT)
Dept: FAMILY MEDICINE | Facility: CLINIC | Age: 75
End: 2020-06-12

## 2020-06-12 RX ORDER — ESCITALOPRAM OXALATE 20 MG/1
20 TABLET ORAL DAILY
Qty: 90 TABLET | Refills: 3 | Status: SHIPPED | OUTPATIENT
Start: 2020-06-12 | End: 2021-06-08 | Stop reason: SDUPTHER

## 2020-06-12 NOTE — TELEPHONE ENCOUNTER
----- Message from Irma Shah MA sent at 6/12/2020  9:43 AM CDT -----  Contact: self/ [phone]  Refill Request  Medication:escitalopram oxalate (LEXAPRO) 20 MG tablet   Pharmacy and Phone :   Additional Info;Per pt stated she is very upset that her meds has not been called in. Pt stated she will call back all day if she has to. Requesting a call back.

## 2020-06-12 NOTE — PROGRESS NOTES
Quick DC. Duplicate Request   Refill Authorization Note    is requesting a refill authorization.    Brief assessment and rationale for refill: QUICK DC: duplicate request          Medication Therapy Plan: Request assessed in alternate encounter    Medication reconciliation completed: No                          Comments:   Pended Medication(s)   Requested Prescriptions     Refused Prescriptions Disp Refills    escitalopram oxalate (LEXAPRO) 20 MG tablet [Pharmacy Med Name: ESCITALOPRAM 20 MG TABLET] 90 tablet 3     Sig: TAKE 1 TABLET (20 MG TOTAL) BY MOUTH ONCE DAILY.     Refused By: CINTHIA SAENZ     Reason for Refusal: Request already responded to by other means (e.g. phone or fax)        Duplicate Pended Encounter(s)/ Last Prescribed Details:    Authorizing Provider: Juancho Benítez MD MAVERICK #:  [id number] NPI:  [id number]    Ordering User:  CINTHIA SAENZ               Original Order:  escitalopram oxalate (LEXAPRO) 20 MG tablet [[id number]]      Pharmacy:  [place] [address] MAVERICK #:  --     Pharmacy Comments:  --          Fill quantity remaining:  -- Fill quantity used:  -- Next fill due: --       Outpatient Medication Detail      Disp Refills Start End    escitalopram oxalate (LEXAPRO) 20 MG tablet 90 tablet 2 6/10/2020     Sig - Route: Take 1 tablet (20 mg total) by mouth once daily. - Oral    Sent to pharmacy as: escitalopram oxalate (LEXAPRO) 20 MG tablet    E-Prescribing Status: Receipt confirmed by pharmacy (6/10/2020  1:38 PM CDT)    Ordering Encounter Report     Associated Reports   View Encounter                Note composed:10:33 PM 06/11/2020

## 2020-06-12 NOTE — TELEPHONE ENCOUNTER
Pt states she is very upset that her meds have not been called in, she also states she will call all day until its called in.. Please advise, medication pending.  LOV:1/23/2020  NOV:none

## 2020-08-26 RX ORDER — ATENOLOL 25 MG/1
25 TABLET ORAL DAILY
Qty: 90 TABLET | Refills: 3 | Status: SHIPPED | OUTPATIENT
Start: 2020-08-26 | End: 2021-06-22

## 2020-08-26 NOTE — TELEPHONE ENCOUNTER
----- Message from Mattie Clarke sent at 8/26/2020 11:19 AM CDT -----  Type: Needs Medical Advice  Who Called:  Patient  [facility]  Best Call Back Number: [phone] (home)   Additional Information: The patient is asking for a call back she said she is upset that the rx for    atenolol (TENORMIN) 25 MG tablet has not been sent to the pharmacy and she needs this Rx refilled today the Pharmacist has been trying to reach out to the Dr at no avail she is asking for a call back in regards as to why no one has gotten back with her she said she needs help getting her Rx she said this has been going on for a year and is asking for this Rx today asap and a call back to explain. Thanks per patient.

## 2020-08-26 NOTE — TELEPHONE ENCOUNTER
Care Due:                  Date            Visit Type   Department     Provider  --------------------------------------------------------------------------------                                ESTABLISHED                              PATIENT      [facility] FAMILY  Last Visit: 01-      [facility]     MEDICINE       Juancho VILLANUEVA  Next Visit: None Scheduled  None         None Found                                                            Last  Test          Frequency    Reason                     Performed    Due Date  --------------------------------------------------------------------------------    WBC.........  12 months..  fenofibrate..............  Not Found    Overdue    Powered by [employer]. Reference number: [id number]. 8/26/2020 1:50:57 PM CDT

## 2020-11-23 DIAGNOSIS — E03.9 HYPOTHYROIDISM, UNSPECIFIED TYPE: Primary | ICD-10-CM

## 2020-11-23 DIAGNOSIS — E78.5 HYPERLIPIDEMIA, UNSPECIFIED HYPERLIPIDEMIA TYPE: ICD-10-CM

## 2020-11-23 NOTE — TELEPHONE ENCOUNTER
Care Due:                  Date            Visit Type   Department     Provider  --------------------------------------------------------------------------------                                ESTABLISHED                              PATIENT      [facility] FAMILY  Last Visit: 01-      [facility]     MEDICINE       Juancho VILLANUEVA  Next Visit: None Scheduled  None         None Found                                                            Last  Test          Frequency    Reason                     Performed    Due Date  --------------------------------------------------------------------------------    Office Visit  12 months..  atenoloL, escitalopram,    01- 01-                             levothyroxine,                             nortriptyline............    TSH.........  12 months..  levothyroxine............  01- 01-    Powered by [employer]. Reference number: [id number]. 11/23/2020 4:58:22 PM   CST

## 2020-11-23 NOTE — TELEPHONE ENCOUNTER
----- Message from Irma Shah MA sent at 11/23/2020  4:31 PM CST -----  Regarding: refill  Refill  Meds: fenofibrate 160 MG Tab  [id number] [place] - [address] [facility] [phone] (Phone)

## 2020-11-24 RX ORDER — FENOFIBRATE 160 MG/1
160 TABLET ORAL DAILY
Qty: 90 TABLET | Refills: 0 | Status: SHIPPED | OUTPATIENT
Start: 2020-11-24 | End: 2021-01-28 | Stop reason: SDUPTHER

## 2020-11-24 NOTE — PROGRESS NOTES
Refill Authorization Note   Odette Hewitt is requesting a refill authorization.  Brief assessment and rationale for refill: Approve    -Medication-related problems identified: Requires labs  Medication Therapy Plan: CDMR; Labs (CBC, CMP, Lipid, TSH)        Comments:   Orders Placed This Encounter    TSH    Lipid Panel    CBC Without Differential    Comprehensive Metabolic Panel    fenofibrate 160 MG Tab      Requested Prescriptions   Signed Prescriptions Disp Refills    fenofibrate 160 MG Tab 90 tablet 0     Sig: Take 1 tablet (160 mg total) by mouth once daily.       Cardiovascular:  Antilipid - Fibric Acid Derivatives Failed - 11/23/2020  4:58 PM        Failed - WBC in normal range and within 360 days     No results found for: EXTWBC, POCWBC, WBC           Passed - Patient is at least 18 years old        Passed - Office visit in past 12 months or future 90 days     Recent Outpatient Visits            10 months ago Routine medical exam    [facility] Medicine Juancho Benítez MD    1 year ago Routine medical exam    [facility] Juancho Benítez MD    2 years ago Cornea abrasion, right, initial encounter    [place] - Optometry CARLIE Parra, OD    2 years ago Cornea abrasion, right, initial encounter    [place] - Optometry CARLIE Parra, OD    2 years ago Rhinitis, unspecified chronicity, unspecified type    [facility] Rosa Bennett NP                    Passed - ALT is 94 or below and within 360 days     ALT   Date Value Ref Range Status   01/23/2020 31 10 - 44 U/L Final   01/17/2019 22 10 - 44 U/L Final   11/30/2017 32 10 - 44 U/L Final              Passed - AST is 54 or below and within 360 days     AST   Date Value Ref Range Status   01/23/2020 29 10 - 40 U/L Final   01/17/2019 26 10 - 40 U/L Final   11/30/2017 36 10 - 40 U/L Final              Passed - Cr is 1.4 or below and within 360 days     Creatinine   Date Value Ref Range Status    01/23/2020 1.0 0.5 - 1.4 mg/dL Final   01/17/2019 1.0 0.5 - 1.4 mg/dL Final   11/30/2017 0.9 0.5 - 1.4 mg/dL Final              Passed - eGFR is 30 or above and within 360 days     eGFR if non    Date Value Ref Range Status   01/23/2020 55.6 (A) >60 mL/min/1.73 m^2 Final     Comment:     Calculation used to obtain the estimated glomerular filtration  rate (eGFR) is the CKD-EPI equation.      01/17/2019 56.0 (A) >60 mL/min/1.73 m^2 Final     Comment:     Calculation used to obtain the estimated glomerular filtration  rate (eGFR) is the CKD-EPI equation.      11/30/2017 >60.0 >60 mL/min/1.73 m^2 Final     Comment:     Calculation used to obtain the estimated glomerular filtration  rate (eGFR) is the CKD-EPI equation.        eGFR if    Date Value Ref Range Status   01/23/2020 >60.0 >60 mL/min/1.73 m^2 Final   01/17/2019 >60.0 >60 mL/min/1.73 m^2 Final   11/30/2017 >60.0 >60 mL/min/1.73 m^2 Final              Passed - Total Cholesterol within 360 days     Cholesterol   Date Value Ref Range Status   01/23/2020 220 (H) 120 - 199 mg/dL Final     Comment:     The National Cholesterol Education Program (NCEP) has set the  following guidelines (reference ranges) for Cholesterol:  Optimal.....................<200 mg/dL  Borderline High.............200-239 mg/dL  High........................> or = 240 mg/dL     01/17/2019 186 120 - 199 mg/dL Final     Comment:     The National Cholesterol Education Program (NCEP) has set the  following guidelines (reference ranges) for Cholesterol:  Optimal.....................<200 mg/dL  Borderline High.............200-239 mg/dL  High........................> or = 240 mg/dL     11/30/2017 200 (H) 120 - 199 mg/dL Final     Comment:     The National Cholesterol Education Program (NCEP) has set the  following guidelines (reference ranges) for Cholesterol:  Optimal.....................<200 mg/dL  Borderline High.............200-239  mg/dL  High........................> or = 240 mg/dL                Passed - LDL within 360 days     LDL Cholesterol   Date Value Ref Range Status   01/23/2020 132.8 63.0 - 159.0 mg/dL Final     Comment:     The National Cholesterol Education Program (NCEP) has set the  following guidelines (reference values) for LDL Cholesterol:  Optimal.......................<130 mg/dL  Borderline High...............130-159 mg/dL  High..........................160-189 mg/dL  Very High.....................>190 mg/dL              Passed - HDL within 360 days     HDL   Date Value Ref Range Status   01/23/2020 46 40 - 75 mg/dL Final     Comment:     The National Cholesterol Education Program (NCEP) has set the  following guidelines (reference values) for HDL Cholesterol:  Low...............<40 mg/dL  Optimal...........>60 mg/dL              Passed - Triglycerides within 360 days     Triglycerides   Date Value Ref Range Status   01/23/2020 206 (H) 30 - 150 mg/dL Final     Comment:     The National Cholesterol Education Program (NCEP) has set the  following guidelines (reference values) for triglycerides:  Normal......................<150 mg/dL  Borderline High.............150-199 mg/dL  High........................200-499 mg/dL     01/17/2019 110 30 - 150 mg/dL Final     Comment:     The National Cholesterol Education Program (NCEP) has set the  following guidelines (reference values) for triglycerides:  Normal......................<150 mg/dL  Borderline High.............150-199 mg/dL  High........................200-499 mg/dL     11/30/2017 174 (H) 30 - 150 mg/dL Final     Comment:     The National Cholesterol Education Program (NCEP) has set the  following guidelines (reference values) for triglycerides:  Normal......................<150 mg/dL  Borderline High.............150-199 mg/dL  High........................200-499 mg/dL                    Appointments  past 12m or future 3m with PCP    Date Provider   Last Visit   1/23/2020  Juancho Benítez MD   Next Visit   Visit date not found Juancho Benítez MD   ED visits in past 90 days: 0     Note composed:8:43 AM 11/24/2020

## 2020-11-24 NOTE — TELEPHONE ENCOUNTER
Provider Staff:     Action is required for this patient.     Please schedule patient for Annual and Labs (CBC, CMP, Lipid, TSH)    Thanks!  [facility]     Appointments  past 12m or future 3m with PCP    Date Provider   Last Visit   1/23/2020 Juancho Benítez MD   Next Visit   Visit date not found Juancho Benítez MD     Note composed:8:43 AM 11/24/2020

## 2021-01-11 ENCOUNTER — TELEPHONE (OUTPATIENT)
Dept: FAMILY MEDICINE | Facility: CLINIC | Age: 76
End: 2021-01-11

## 2021-01-11 DIAGNOSIS — Z12.31 ENCOUNTER FOR SCREENING MAMMOGRAM FOR MALIGNANT NEOPLASM OF BREAST: Primary | ICD-10-CM

## 2021-01-14 ENCOUNTER — IMMUNIZATION (OUTPATIENT)
Dept: FAMILY MEDICINE | Facility: CLINIC | Age: 76
End: 2021-01-14
Payer: MEDICARE

## 2021-01-14 DIAGNOSIS — Z23 NEED FOR VACCINATION: ICD-10-CM

## 2021-01-14 PROCEDURE — 91300 COVID-19, MRNA, LNP-S, PF, 30 MCG/0.3 ML DOSE VACCINE: CPT | Mod: PBBFAC | Performed by: INTERNAL MEDICINE

## 2021-01-15 ENCOUNTER — PATIENT OUTREACH (OUTPATIENT)
Dept: ADMINISTRATIVE | Facility: HOSPITAL | Age: 76
End: 2021-01-15

## 2021-01-28 ENCOUNTER — OFFICE VISIT (OUTPATIENT)
Dept: FAMILY MEDICINE | Facility: CLINIC | Age: 76
End: 2021-01-28
Payer: MEDICARE

## 2021-01-28 ENCOUNTER — LAB VISIT (OUTPATIENT)
Dept: LAB | Facility: HOSPITAL | Age: 76
End: 2021-01-28
Attending: FAMILY MEDICINE
Payer: MEDICARE

## 2021-01-28 VITALS
SYSTOLIC BLOOD PRESSURE: 130 MMHG | BODY MASS INDEX: 27.78 KG/M2 | OXYGEN SATURATION: 97 % | HEART RATE: 75 BPM | DIASTOLIC BLOOD PRESSURE: 78 MMHG | WEIGHT: 161.81 LBS

## 2021-01-28 DIAGNOSIS — F41.9 ANXIETY: ICD-10-CM

## 2021-01-28 DIAGNOSIS — E03.9 HYPOTHYROIDISM, UNSPECIFIED TYPE: ICD-10-CM

## 2021-01-28 DIAGNOSIS — I10 ESSENTIAL HYPERTENSION: ICD-10-CM

## 2021-01-28 DIAGNOSIS — Z00.00 ROUTINE MEDICAL EXAM: Primary | ICD-10-CM

## 2021-01-28 DIAGNOSIS — E78.5 HYPERLIPIDEMIA, UNSPECIFIED HYPERLIPIDEMIA TYPE: ICD-10-CM

## 2021-01-28 DIAGNOSIS — Z78.0 MENOPAUSE: ICD-10-CM

## 2021-01-28 LAB
ALBUMIN SERPL BCP-MCNC: 3.6 G/DL (ref 3.5–5.2)
ALP SERPL-CCNC: 54 U/L (ref 55–135)
ALT SERPL W/O P-5'-P-CCNC: 28 U/L (ref 10–44)
ANION GAP SERPL CALC-SCNC: 7 MMOL/L (ref 8–16)
AST SERPL-CCNC: 26 U/L (ref 10–40)
BILIRUB SERPL-MCNC: 0.2 MG/DL (ref 0.1–1)
BUN SERPL-MCNC: 22 MG/DL (ref 8–23)
CALCIUM SERPL-MCNC: 9.4 MG/DL (ref 8.7–10.5)
CHLORIDE SERPL-SCNC: 108 MMOL/L (ref 95–110)
CHOLEST SERPL-MCNC: 179 MG/DL (ref 120–199)
CHOLEST/HDLC SERPL: 4.5 {RATIO} (ref 2–5)
CO2 SERPL-SCNC: 25 MMOL/L (ref 23–29)
CREAT SERPL-MCNC: 0.9 MG/DL (ref 0.5–1.4)
ERYTHROCYTE [DISTWIDTH] IN BLOOD BY AUTOMATED COUNT: 13.2 % (ref 11.5–14.5)
EST. GFR  (AFRICAN AMERICAN): >60 ML/MIN/1.73 M^2
EST. GFR  (NON AFRICAN AMERICAN): >60 ML/MIN/1.73 M^2
GLUCOSE SERPL-MCNC: 119 MG/DL (ref 70–110)
HCT VFR BLD AUTO: 39.7 % (ref 37–48.5)
HDLC SERPL-MCNC: 40 MG/DL (ref 40–75)
HDLC SERPL: 22.3 % (ref 20–50)
HGB BLD-MCNC: 12.6 G/DL (ref 12–16)
LDLC SERPL CALC-MCNC: 105.6 MG/DL (ref 63–159)
MCH RBC QN AUTO: 28.9 PG (ref 27–31)
MCHC RBC AUTO-ENTMCNC: 31.7 G/DL (ref 32–36)
MCV RBC AUTO: 91 FL (ref 82–98)
NONHDLC SERPL-MCNC: 139 MG/DL
PLATELET # BLD AUTO: 254 K/UL (ref 150–350)
PMV BLD AUTO: 11.5 FL (ref 9.2–12.9)
POTASSIUM SERPL-SCNC: 4 MMOL/L (ref 3.5–5.1)
PROT SERPL-MCNC: 6.8 G/DL (ref 6–8.4)
RBC # BLD AUTO: 4.36 M/UL (ref 4–5.4)
SODIUM SERPL-SCNC: 140 MMOL/L (ref 136–145)
TRIGL SERPL-MCNC: 167 MG/DL (ref 30–150)
TSH SERPL DL<=0.005 MIU/L-ACNC: 2.59 UIU/ML (ref 0.4–4)
WBC # BLD AUTO: 7.57 K/UL (ref 3.9–12.7)

## 2021-01-28 PROCEDURE — 3078F PR MOST RECENT DIASTOLIC BLOOD PRESSURE < 80 MM HG: ICD-10-PCS | Mod: CPTII,S$GLB,, | Performed by: FAMILY MEDICINE

## 2021-01-28 PROCEDURE — 99999 PR PBB SHADOW E&M-EST. PATIENT-LVL IV: CPT | Mod: PBBFAC,,, | Performed by: FAMILY MEDICINE

## 2021-01-28 PROCEDURE — 85027 COMPLETE CBC AUTOMATED: CPT

## 2021-01-28 PROCEDURE — 3075F SYST BP GE 130 - 139MM HG: CPT | Mod: CPTII,S$GLB,, | Performed by: FAMILY MEDICINE

## 2021-01-28 PROCEDURE — 99214 PR OFFICE/OUTPT VISIT, EST, LEVL IV, 30-39 MIN: ICD-10-PCS | Mod: S$GLB,,, | Performed by: FAMILY MEDICINE

## 2021-01-28 PROCEDURE — 3075F PR MOST RECENT SYSTOLIC BLOOD PRESS GE 130-139MM HG: ICD-10-PCS | Mod: CPTII,S$GLB,, | Performed by: FAMILY MEDICINE

## 2021-01-28 PROCEDURE — 84443 ASSAY THYROID STIM HORMONE: CPT

## 2021-01-28 PROCEDURE — 80061 LIPID PANEL: CPT

## 2021-01-28 PROCEDURE — 99499 UNLISTED E&M SERVICE: CPT | Mod: S$GLB,,, | Performed by: FAMILY MEDICINE

## 2021-01-28 PROCEDURE — 3078F DIAST BP <80 MM HG: CPT | Mod: CPTII,S$GLB,, | Performed by: FAMILY MEDICINE

## 2021-01-28 PROCEDURE — 80053 COMPREHEN METABOLIC PANEL: CPT

## 2021-01-28 PROCEDURE — 1125F PR PAIN SEVERITY QUANTIFIED, PAIN PRESENT: ICD-10-PCS | Mod: S$GLB,,, | Performed by: FAMILY MEDICINE

## 2021-01-28 PROCEDURE — 99499 RISK ADDL DX/OHS AUDIT: ICD-10-PCS | Mod: S$GLB,,, | Performed by: FAMILY MEDICINE

## 2021-01-28 PROCEDURE — 1125F AMNT PAIN NOTED PAIN PRSNT: CPT | Mod: S$GLB,,, | Performed by: FAMILY MEDICINE

## 2021-01-28 PROCEDURE — 99999 PR PBB SHADOW E&M-EST. PATIENT-LVL IV: ICD-10-PCS | Mod: PBBFAC,,, | Performed by: FAMILY MEDICINE

## 2021-01-28 PROCEDURE — 99214 OFFICE O/P EST MOD 30 MIN: CPT | Mod: S$GLB,,, | Performed by: FAMILY MEDICINE

## 2021-01-28 PROCEDURE — 36415 COLL VENOUS BLD VENIPUNCTURE: CPT | Mod: PO

## 2021-01-28 RX ORDER — FENOFIBRATE 160 MG/1
160 TABLET ORAL DAILY
Qty: 90 TABLET | Refills: 3 | Status: SHIPPED | OUTPATIENT
Start: 2021-01-28 | End: 2022-02-13

## 2021-01-28 RX ORDER — NORTRIPTYLINE HYDROCHLORIDE 25 MG/1
CAPSULE ORAL
Qty: 90 CAPSULE | Refills: 3 | Status: SHIPPED | OUTPATIENT
Start: 2021-01-28 | End: 2021-04-23

## 2021-02-04 ENCOUNTER — IMMUNIZATION (OUTPATIENT)
Dept: FAMILY MEDICINE | Facility: CLINIC | Age: 76
End: 2021-02-04
Payer: MEDICARE

## 2021-02-04 DIAGNOSIS — Z23 NEED FOR VACCINATION: Primary | ICD-10-CM

## 2021-02-04 PROCEDURE — 0002A COVID-19, MRNA, LNP-S, PF, 30 MCG/0.3 ML DOSE VACCINE: CPT | Mod: PBBFAC | Performed by: FAMILY MEDICINE

## 2021-02-04 PROCEDURE — 91300 COVID-19, MRNA, LNP-S, PF, 30 MCG/0.3 ML DOSE VACCINE: CPT | Mod: PBBFAC | Performed by: FAMILY MEDICINE

## 2021-03-02 ENCOUNTER — HOSPITAL ENCOUNTER (OUTPATIENT)
Dept: RADIOLOGY | Facility: HOSPITAL | Age: 76
Discharge: HOME | End: 2021-03-02
Attending: FAMILY MEDICINE
Payer: MEDICARE

## 2021-03-02 DIAGNOSIS — Z12.31 ENCOUNTER FOR SCREENING MAMMOGRAM FOR MALIGNANT NEOPLASM OF BREAST: ICD-10-CM

## 2021-03-02 DIAGNOSIS — Z78.0 MENOPAUSE: ICD-10-CM

## 2021-03-02 PROCEDURE — 77063 BREAST TOMOSYNTHESIS BI: CPT | Mod: 26,,, | Performed by: RADIOLOGY

## 2021-03-02 PROCEDURE — 77067 SCR MAMMO BI INCL CAD: CPT | Mod: TC,PO

## 2021-03-02 PROCEDURE — 77080 DEXA BONE DENSITY SPINE HIP: ICD-10-PCS | Mod: 26,,, | Performed by: RADIOLOGY

## 2021-03-02 PROCEDURE — 77063 MAMMO DIGITAL SCREENING BILAT WITH TOMO: ICD-10-PCS | Mod: 26,,, | Performed by: RADIOLOGY

## 2021-03-02 PROCEDURE — 77080 DXA BONE DENSITY AXIAL: CPT | Mod: TC,PO

## 2021-03-02 PROCEDURE — 77067 SCR MAMMO BI INCL CAD: CPT | Mod: 26,,, | Performed by: RADIOLOGY

## 2021-03-02 PROCEDURE — 77080 DXA BONE DENSITY AXIAL: CPT | Mod: 26,,, | Performed by: RADIOLOGY

## 2021-03-02 PROCEDURE — 77067 MAMMO DIGITAL SCREENING BILAT WITH TOMO: ICD-10-PCS | Mod: 26,,, | Performed by: RADIOLOGY

## 2021-03-21 DIAGNOSIS — E03.9 HYPOTHYROIDISM, UNSPECIFIED TYPE: ICD-10-CM

## 2021-03-24 RX ORDER — LEVOTHYROXINE SODIUM 75 UG/1
TABLET ORAL
Qty: 90 TABLET | Refills: 3 | Status: SHIPPED | OUTPATIENT
Start: 2021-03-24 | End: 2022-03-21

## 2021-04-26 RX ORDER — NORTRIPTYLINE HYDROCHLORIDE 25 MG/1
25 CAPSULE ORAL NIGHTLY
Qty: 90 CAPSULE | Refills: 3 | Status: SHIPPED | OUTPATIENT
Start: 2021-04-26 | End: 2022-04-11

## 2021-06-08 RX ORDER — ESCITALOPRAM OXALATE 20 MG/1
20 TABLET ORAL DAILY
Qty: 90 TABLET | Refills: 3 | Status: SHIPPED | OUTPATIENT
Start: 2021-06-08 | End: 2022-05-31

## 2021-06-22 RX ORDER — ATENOLOL 25 MG/1
TABLET ORAL
Qty: 90 TABLET | Refills: 2 | Status: SHIPPED | OUTPATIENT
Start: 2021-06-22 | End: 2022-04-14

## 2021-07-30 ENCOUNTER — OFFICE VISIT (OUTPATIENT)
Dept: FAMILY MEDICINE | Facility: CLINIC | Age: 76
End: 2021-07-30
Payer: MEDICARE

## 2021-07-30 VITALS
BODY MASS INDEX: 27.74 KG/M2 | OXYGEN SATURATION: 97 % | SYSTOLIC BLOOD PRESSURE: 126 MMHG | TEMPERATURE: 98 F | WEIGHT: 161.63 LBS | DIASTOLIC BLOOD PRESSURE: 74 MMHG | HEART RATE: 69 BPM

## 2021-07-30 DIAGNOSIS — L23.9 ALLERGIC CONTACT DERMATITIS, UNSPECIFIED TRIGGER: ICD-10-CM

## 2021-07-30 DIAGNOSIS — H65.05 RECURRENT ACUTE SEROUS OTITIS MEDIA OF LEFT EAR: Primary | ICD-10-CM

## 2021-07-30 PROCEDURE — 3078F DIAST BP <80 MM HG: CPT | Mod: CPTII,S$GLB,, | Performed by: PHYSICIAN ASSISTANT

## 2021-07-30 PROCEDURE — 1126F AMNT PAIN NOTED NONE PRSNT: CPT | Mod: CPTII,S$GLB,, | Performed by: PHYSICIAN ASSISTANT

## 2021-07-30 PROCEDURE — 1160F RVW MEDS BY RX/DR IN RCRD: CPT | Mod: CPTII,S$GLB,, | Performed by: PHYSICIAN ASSISTANT

## 2021-07-30 PROCEDURE — 1160F PR REVIEW ALL MEDS BY PRESCRIBER/CLIN PHARMACIST DOCUMENTED: ICD-10-PCS | Mod: CPTII,S$GLB,, | Performed by: PHYSICIAN ASSISTANT

## 2021-07-30 PROCEDURE — 99214 PR OFFICE/OUTPT VISIT, EST, LEVL IV, 30-39 MIN: ICD-10-PCS | Mod: S$GLB,,, | Performed by: PHYSICIAN ASSISTANT

## 2021-07-30 PROCEDURE — 3074F PR MOST RECENT SYSTOLIC BLOOD PRESSURE < 130 MM HG: ICD-10-PCS | Mod: CPTII,S$GLB,, | Performed by: PHYSICIAN ASSISTANT

## 2021-07-30 PROCEDURE — 3288F PR FALLS RISK ASSESSMENT DOCUMENTED: ICD-10-PCS | Mod: CPTII,S$GLB,, | Performed by: PHYSICIAN ASSISTANT

## 2021-07-30 PROCEDURE — 99999 PR PBB SHADOW E&M-EST. PATIENT-LVL IV: ICD-10-PCS | Mod: PBBFAC,,, | Performed by: PHYSICIAN ASSISTANT

## 2021-07-30 PROCEDURE — 1159F MED LIST DOCD IN RCRD: CPT | Mod: CPTII,S$GLB,, | Performed by: PHYSICIAN ASSISTANT

## 2021-07-30 PROCEDURE — 99214 OFFICE O/P EST MOD 30 MIN: CPT | Mod: S$GLB,,, | Performed by: PHYSICIAN ASSISTANT

## 2021-07-30 PROCEDURE — 3074F SYST BP LT 130 MM HG: CPT | Mod: CPTII,S$GLB,, | Performed by: PHYSICIAN ASSISTANT

## 2021-07-30 PROCEDURE — 3078F PR MOST RECENT DIASTOLIC BLOOD PRESSURE < 80 MM HG: ICD-10-PCS | Mod: CPTII,S$GLB,, | Performed by: PHYSICIAN ASSISTANT

## 2021-07-30 PROCEDURE — 1126F PR PAIN SEVERITY QUANTIFIED, NO PAIN PRESENT: ICD-10-PCS | Mod: CPTII,S$GLB,, | Performed by: PHYSICIAN ASSISTANT

## 2021-07-30 PROCEDURE — 3288F FALL RISK ASSESSMENT DOCD: CPT | Mod: CPTII,S$GLB,, | Performed by: PHYSICIAN ASSISTANT

## 2021-07-30 PROCEDURE — 1100F PTFALLS ASSESS-DOCD GE2>/YR: CPT | Mod: CPTII,S$GLB,, | Performed by: PHYSICIAN ASSISTANT

## 2021-07-30 PROCEDURE — 99999 PR PBB SHADOW E&M-EST. PATIENT-LVL IV: CPT | Mod: PBBFAC,,, | Performed by: PHYSICIAN ASSISTANT

## 2021-07-30 PROCEDURE — 1159F PR MEDICATION LIST DOCUMENTED IN MEDICAL RECORD: ICD-10-PCS | Mod: CPTII,S$GLB,, | Performed by: PHYSICIAN ASSISTANT

## 2021-07-30 PROCEDURE — 1100F PR PT FALLS ASSESS DOC 2+ FALLS/FALL W/INJURY/YR: ICD-10-PCS | Mod: CPTII,S$GLB,, | Performed by: PHYSICIAN ASSISTANT

## 2021-07-30 RX ORDER — PREDNISONE 10 MG/1
TABLET ORAL
Qty: 18 TABLET | Refills: 0 | Status: SHIPPED | OUTPATIENT
Start: 2021-07-30 | End: 2022-01-17

## 2021-08-18 ENCOUNTER — OFFICE VISIT (OUTPATIENT)
Dept: OTOLARYNGOLOGY | Facility: CLINIC | Age: 76
End: 2021-08-18
Payer: MEDICARE

## 2021-08-18 VITALS — BODY MASS INDEX: 27.74 KG/M2 | WEIGHT: 161.63 LBS

## 2021-08-18 DIAGNOSIS — H65.21 RIGHT CHRONIC SEROUS OTITIS MEDIA: ICD-10-CM

## 2021-08-18 DIAGNOSIS — H69.93 ETD (EUSTACHIAN TUBE DYSFUNCTION), BILATERAL: Primary | ICD-10-CM

## 2021-08-18 PROCEDURE — 99203 PR OFFICE/OUTPT VISIT, NEW, LEVL III, 30-44 MIN: ICD-10-PCS | Mod: 25,S$GLB,, | Performed by: OTOLARYNGOLOGY

## 2021-08-18 PROCEDURE — 3288F FALL RISK ASSESSMENT DOCD: CPT | Mod: CPTII,S$GLB,, | Performed by: OTOLARYNGOLOGY

## 2021-08-18 PROCEDURE — 1126F AMNT PAIN NOTED NONE PRSNT: CPT | Mod: CPTII,S$GLB,, | Performed by: OTOLARYNGOLOGY

## 2021-08-18 PROCEDURE — 1159F PR MEDICATION LIST DOCUMENTED IN MEDICAL RECORD: ICD-10-PCS | Mod: CPTII,S$GLB,, | Performed by: OTOLARYNGOLOGY

## 2021-08-18 PROCEDURE — 99999 PR PBB SHADOW E&M-EST. PATIENT-LVL III: ICD-10-PCS | Mod: PBBFAC,,, | Performed by: OTOLARYNGOLOGY

## 2021-08-18 PROCEDURE — 3288F PR FALLS RISK ASSESSMENT DOCUMENTED: ICD-10-PCS | Mod: CPTII,S$GLB,, | Performed by: OTOLARYNGOLOGY

## 2021-08-18 PROCEDURE — 1101F PR PT FALLS ASSESS DOC 0-1 FALLS W/OUT INJ PAST YR: ICD-10-PCS | Mod: CPTII,S$GLB,, | Performed by: OTOLARYNGOLOGY

## 2021-08-18 PROCEDURE — 1159F MED LIST DOCD IN RCRD: CPT | Mod: CPTII,S$GLB,, | Performed by: OTOLARYNGOLOGY

## 2021-08-18 PROCEDURE — 99203 OFFICE O/P NEW LOW 30 MIN: CPT | Mod: 25,S$GLB,, | Performed by: OTOLARYNGOLOGY

## 2021-08-18 PROCEDURE — 1160F PR REVIEW ALL MEDS BY PRESCRIBER/CLIN PHARMACIST DOCUMENTED: ICD-10-PCS | Mod: CPTII,S$GLB,, | Performed by: OTOLARYNGOLOGY

## 2021-08-18 PROCEDURE — 69420 PR INCISION EARDRUM,ASPIR: ICD-10-PCS | Mod: RT,S$GLB,, | Performed by: OTOLARYNGOLOGY

## 2021-08-18 PROCEDURE — 1126F PR PAIN SEVERITY QUANTIFIED, NO PAIN PRESENT: ICD-10-PCS | Mod: CPTII,S$GLB,, | Performed by: OTOLARYNGOLOGY

## 2021-08-18 PROCEDURE — 69420 INCISION OF EARDRUM: CPT | Mod: RT,S$GLB,, | Performed by: OTOLARYNGOLOGY

## 2021-08-18 PROCEDURE — 99999 PR PBB SHADOW E&M-EST. PATIENT-LVL III: CPT | Mod: PBBFAC,,, | Performed by: OTOLARYNGOLOGY

## 2021-08-18 PROCEDURE — 1101F PT FALLS ASSESS-DOCD LE1/YR: CPT | Mod: CPTII,S$GLB,, | Performed by: OTOLARYNGOLOGY

## 2021-08-18 PROCEDURE — 1160F RVW MEDS BY RX/DR IN RCRD: CPT | Mod: CPTII,S$GLB,, | Performed by: OTOLARYNGOLOGY

## 2021-11-19 ENCOUNTER — PES CALL (OUTPATIENT)
Dept: ADMINISTRATIVE | Facility: CLINIC | Age: 76
End: 2021-11-19
Payer: MEDICARE

## 2021-12-16 ENCOUNTER — PATIENT MESSAGE (OUTPATIENT)
Dept: ADMINISTRATIVE | Facility: CLINIC | Age: 76
End: 2021-12-16
Payer: MEDICARE

## 2021-12-16 ENCOUNTER — TELEPHONE (OUTPATIENT)
Dept: ADMINISTRATIVE | Facility: CLINIC | Age: 76
End: 2021-12-16
Payer: MEDICARE

## 2021-12-29 ENCOUNTER — LAB VISIT (OUTPATIENT)
Dept: PRIMARY CARE CLINIC | Facility: OTHER | Age: 76
End: 2021-12-29
Payer: MEDICARE

## 2021-12-29 DIAGNOSIS — Z20.822 ENCOUNTER FOR LABORATORY TESTING FOR COVID-19 VIRUS: ICD-10-CM

## 2021-12-29 PROCEDURE — U0003 INFECTIOUS AGENT DETECTION BY NUCLEIC ACID (DNA OR RNA); SEVERE ACUTE RESPIRATORY SYNDROME CORONAVIRUS 2 (SARS-COV-2) (CORONAVIRUS DISEASE [COVID-19]), AMPLIFIED PROBE TECHNIQUE, MAKING USE OF HIGH THROUGHPUT TECHNOLOGIES AS DESCRIBED BY CMS-2020-01-R: HCPCS | Performed by: FAMILY MEDICINE

## 2021-12-30 ENCOUNTER — TELEPHONE (OUTPATIENT)
Dept: FAMILY MEDICINE | Facility: CLINIC | Age: 76
End: 2021-12-30
Payer: MEDICARE

## 2021-12-31 LAB
SARS-COV-2 RNA RESP QL NAA+PROBE: NOT DETECTED
SARS-COV-2- CYCLE NUMBER: NORMAL

## 2022-01-01 ENCOUNTER — NURSE TRIAGE (OUTPATIENT)
Dept: ADMINISTRATIVE | Facility: CLINIC | Age: 77
End: 2022-01-01
Payer: MEDICARE

## 2022-01-01 NOTE — TELEPHONE ENCOUNTER
Spoke with patient states she went on Wednesday to do covid testing.  She states she still has lingering cough.  Fever has subsided.  States she is unaware of covid results.  She cannot do mychart.  States she has been taking mucinex and advil when she gets a headache.  Patient tolerating fluids.  No on call provider listed for PCP.  Also verified with Romi () and no weekend on call provider listed.  Patient given information and she is aware message will be sent to office for follow up .  Advised patient to be seen within 3-4 hours.  Offered OAC and RR patient declined.     Reason for Disposition   HIGH RISK for severe COVID complications (e.g., age > 64 years, obesity with BMI > 25, pregnant, chronic lung disease or other chronic medical condition)  (Exception: Already seen by PCP and no new or worsening symptoms.)    Additional Information   Negative: SEVERE difficulty breathing (e.g., struggling for each breath, speaks in single words)   Negative: Difficult to awaken or acting confused (e.g., disoriented, slurred speech)   Negative: Bluish (or gray) lips or face now   Negative: Shock suspected (e.g., cold/pale/clammy skin, too weak to stand, low BP, rapid pulse)   Negative: Sounds like a life-threatening emergency to the triager   Negative: SEVERE or constant chest pain or pressure (Exception: mild central chest pain, present only when coughing)   Negative: MODERATE difficulty breathing (e.g., speaks in phrases, SOB even at rest, pulse 100-120)   Negative: [1] Headache AND [2] stiff neck (can't touch chin to chest)   Negative: MILD difficulty breathing (e.g., minimal/no SOB at rest, SOB with walking, pulse <100)   Negative: Chest pain or pressure   Negative: Patient sounds very sick or weak to the triager   Negative: Fever > 103 F (39.4 C)   Negative: [1] Fever > 101 F (38.3 C) AND [2] age > 60 years   Negative: [1] Fever > 100.0 F (37.8 C) AND [2] bedridden (e.g., nursing home patient,  CVA, chronic illness, recovering from surgery)    Protocols used: CORONAVIRUS (COVID-19) DIAGNOSED OR [id number]

## 2022-01-04 NOTE — TELEPHONE ENCOUNTER
Her covid test is negative.    Please call patient to find out if she is feeling better. If not, will need to see me

## 2022-01-10 ENCOUNTER — TELEPHONE (OUTPATIENT)
Dept: OTOLARYNGOLOGY | Facility: CLINIC | Age: 77
End: 2022-01-10
Payer: MEDICARE

## 2022-01-10 NOTE — TELEPHONE ENCOUNTER
----- Message from Lili Galeana sent at 1/10/2022  9:30 AM CST -----  Type:  Sooner Appointment Request    Caller is requesting a sooner appointment.  Caller declined first available appointment listed below.      Name of Caller:  Odette Hewitt  When is the first available appointment?  February  Symptoms:  Ears are blocked and cannot hear  Best Call Back Number:  [phone]  Additional Information: wants to see Dr. Medina ONLY

## 2022-01-10 NOTE — TELEPHONE ENCOUNTER
S/w pt that states her ears are blocked again. Pt states that Dr. Medina drained her ears in May and stated that she will need a tube if it continues to get blocked. Scheduled pt an appt and pt confirmed.

## 2022-01-14 ENCOUNTER — OFFICE VISIT (OUTPATIENT)
Dept: HOME HEALTH SERVICES | Facility: CLINIC | Age: 77
End: 2022-01-14
Payer: MEDICARE

## 2022-01-14 VITALS
DIASTOLIC BLOOD PRESSURE: 76 MMHG | HEIGHT: 64 IN | SYSTOLIC BLOOD PRESSURE: 120 MMHG | HEART RATE: 65 BPM | WEIGHT: 154 LBS | BODY MASS INDEX: 26.29 KG/M2

## 2022-01-14 DIAGNOSIS — F33.0 MAJOR DEPRESSIVE DISORDER, RECURRENT, MILD: ICD-10-CM

## 2022-01-14 DIAGNOSIS — Z00.00 ENCOUNTER FOR PREVENTIVE HEALTH EXAMINATION: Primary | ICD-10-CM

## 2022-01-14 DIAGNOSIS — E03.9 HYPOTHYROIDISM, UNSPECIFIED TYPE: ICD-10-CM

## 2022-01-14 DIAGNOSIS — I10 ESSENTIAL HYPERTENSION: ICD-10-CM

## 2022-01-14 DIAGNOSIS — F41.9 ANXIETY: ICD-10-CM

## 2022-01-14 DIAGNOSIS — Z53.1 REFUSAL OF BLOOD TRANSFUSIONS AS PATIENT IS JEHOVAH'S WITNESS: ICD-10-CM

## 2022-01-14 DIAGNOSIS — E78.5 HYPERLIPIDEMIA, UNSPECIFIED HYPERLIPIDEMIA TYPE: ICD-10-CM

## 2022-01-14 PROCEDURE — 1101F PT FALLS ASSESS-DOCD LE1/YR: CPT | Mod: CPTII,S$GLB,, | Performed by: NURSE PRACTITIONER

## 2022-01-14 PROCEDURE — 1159F PR MEDICATION LIST DOCUMENTED IN MEDICAL RECORD: ICD-10-PCS | Mod: CPTII,S$GLB,, | Performed by: NURSE PRACTITIONER

## 2022-01-14 PROCEDURE — 1160F RVW MEDS BY RX/DR IN RCRD: CPT | Mod: CPTII,S$GLB,, | Performed by: NURSE PRACTITIONER

## 2022-01-14 PROCEDURE — G0439 PR MEDICARE ANNUAL WELLNESS SUBSEQUENT VISIT: ICD-10-PCS | Mod: S$GLB,,, | Performed by: NURSE PRACTITIONER

## 2022-01-14 PROCEDURE — 3078F DIAST BP <80 MM HG: CPT | Mod: CPTII,S$GLB,, | Performed by: NURSE PRACTITIONER

## 2022-01-14 PROCEDURE — 1160F PR REVIEW ALL MEDS BY PRESCRIBER/CLIN PHARMACIST DOCUMENTED: ICD-10-PCS | Mod: CPTII,S$GLB,, | Performed by: NURSE PRACTITIONER

## 2022-01-14 PROCEDURE — 99499 UNLISTED E&M SERVICE: CPT | Mod: S$GLB,,, | Performed by: NURSE PRACTITIONER

## 2022-01-14 PROCEDURE — 99499 RISK ADDL DX/OHS AUDIT: ICD-10-PCS | Mod: S$GLB,,, | Performed by: NURSE PRACTITIONER

## 2022-01-14 PROCEDURE — 3288F FALL RISK ASSESSMENT DOCD: CPT | Mod: CPTII,S$GLB,, | Performed by: NURSE PRACTITIONER

## 2022-01-14 PROCEDURE — 1101F PR PT FALLS ASSESS DOC 0-1 FALLS W/OUT INJ PAST YR: ICD-10-PCS | Mod: CPTII,S$GLB,, | Performed by: NURSE PRACTITIONER

## 2022-01-14 PROCEDURE — 3288F PR FALLS RISK ASSESSMENT DOCUMENTED: ICD-10-PCS | Mod: CPTII,S$GLB,, | Performed by: NURSE PRACTITIONER

## 2022-01-14 PROCEDURE — G0439 PPPS, SUBSEQ VISIT: HCPCS | Mod: S$GLB,,, | Performed by: NURSE PRACTITIONER

## 2022-01-14 PROCEDURE — 3078F PR MOST RECENT DIASTOLIC BLOOD PRESSURE < 80 MM HG: ICD-10-PCS | Mod: CPTII,S$GLB,, | Performed by: NURSE PRACTITIONER

## 2022-01-14 PROCEDURE — 1159F MED LIST DOCD IN RCRD: CPT | Mod: CPTII,S$GLB,, | Performed by: NURSE PRACTITIONER

## 2022-01-14 PROCEDURE — 3074F PR MOST RECENT SYSTOLIC BLOOD PRESSURE < 130 MM HG: ICD-10-PCS | Mod: CPTII,S$GLB,, | Performed by: NURSE PRACTITIONER

## 2022-01-14 PROCEDURE — 3074F SYST BP LT 130 MM HG: CPT | Mod: CPTII,S$GLB,, | Performed by: NURSE PRACTITIONER

## 2022-01-17 PROBLEM — F33.0 MAJOR DEPRESSIVE DISORDER, RECURRENT, MILD: Status: ACTIVE | Noted: 2022-01-17

## 2022-01-17 NOTE — PROGRESS NOTES
"  Odette Hewitt presented for a  Medicare AWV and comprehensive Health Risk Assessment today. The following components were reviewed and updated:    · Medical history  · Family History  · Social history  · Allergies and Current Medications  · Health Risk Assessment  · Health Maintenance  · Care Team         ** See Completed Assessments for Annual Wellness Visit within the encounter summary.**         The following assessments were completed:  · Living Situation  · CAGE  · Depression Screening  · Timed Get Up and Go  · Whisper Test  · Cognitive Function Screening  · Nutrition Screening  · ADL Screening  · PAQ Screening        Vitals:    01/14/22 1215   BP: 120/76   Pulse: 65   Weight: 69.9 kg (154 lb)   Height: 5' 4" (1.626 m)     Body mass index is 26.43 kg/m².  Physical Exam  Constitutional:       Appearance: Normal appearance.   HENT:      Head: Normocephalic and atraumatic.      Nose: Nose normal.   Eyes:      Extraocular Movements: Extraocular movements intact.      Pupils: Pupils are equal, round, and reactive to light.   Cardiovascular:      Rate and Rhythm: Normal rate and regular rhythm.      Pulses: Normal pulses.      Heart sounds: Normal heart sounds.   Pulmonary:      Effort: Pulmonary effort is normal.      Breath sounds: Normal breath sounds.   Musculoskeletal:      Cervical back: Normal range of motion and neck supple.   Neurological:      General: No focal deficit present.      Mental Status: She is alert and oriented to person, place, and time.               Diagnoses and health risks identified today and associated recommendations/orders:    1. Encounter for preventive health examination  AWV completed    2. Major depressive disorder, recurrent, mild  Chronic and stable. Continue current treatment. Follow with PCP.  On lexapro    3. Anxiety  Chronic and stable. Continue current treatment. Follow with PCP.  On lexapro     4. Hyperlipidemia, unspecified hyperlipidemia type  Chronic and stable. " Continue current treatment. Follow with PCP.  On meds    5. Essential hypertension  Chronic and stable. Continue current treatment. Follow with PCP.  BP stable    6. Hypothyroidism, unspecified type  Chronic and stable. Continue current treatment. Follow with PCP.  On replacement    7. 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 Continue current treatment. Follow with PCP.        Provided Odette with a 5-10 year written screening schedule and personal prevention plan. Recommendations were developed using the USPSTF age appropriate recommendations. Education, counseling, and referrals were provided as needed. After Visit Summary printed and given to patient which includes a list of additional screenings\tests needed.    Fu in 1 yr for GARY Cintron, ZENIA  I offered to discuss advanced care planning, including how to pick a person who would make decisions for you if you were unable to make them for yourself, called a health care power of , and what kind of decisions you might make such as use of life sustaining treatments such as ventilators and tube feeding when faced with a life limiting illness recorded on a living will that they will need to know. (How you want to be cared for as you near the end of your natural life)     X Patient is interested in learning more about how to make advanced directives.  I provided them paperwork and offered to discuss this with them.

## 2022-01-17 NOTE — PATIENT INSTRUCTIONS
Counseling and Referral of Other Preventative  (Italic type indicates deductible and co-insurance are waived)    Patient Name: Odette Hewitt  Today's Date: 1/17/2022    Health Maintenance       Date Due Completion Date    Lipid Panel 01/28/2022 1/28/2021    DEXA SCAN 03/02/2024 3/2/2021    TETANUS VACCINE 05/01/2024 5/1/2014 (Done)    Override on 5/1/2014: Done        No orders of the defined types were placed in this encounter.    The following information is provided to all patients.  This information is to help you find resources for any of the problems found today that may be affecting your health:                Living healthy guide: www.[facility].gov      Understanding Diabetes: www.diabetes.org      Eating healthy: www.cdc.gov/healthyweight      CDC home safety checklist: www.cdc.gov/steadi/patient.html      Agency on Aging: www.goea.[place].[facility]      Alcoholics anonymous (AA): www.aa.org      Physical Activity: www.angie.nih.gov/la0zhos      Tobacco use: www.quitwithusla.org

## 2022-01-20 ENCOUNTER — OFFICE VISIT (OUTPATIENT)
Dept: FAMILY MEDICINE | Facility: CLINIC | Age: 77
End: 2022-01-20
Payer: MEDICARE

## 2022-01-20 ENCOUNTER — TELEPHONE (OUTPATIENT)
Dept: FAMILY MEDICINE | Facility: CLINIC | Age: 77
End: 2022-01-20
Payer: MEDICARE

## 2022-01-20 VITALS — DIASTOLIC BLOOD PRESSURE: 72 MMHG | SYSTOLIC BLOOD PRESSURE: 122 MMHG

## 2022-01-20 DIAGNOSIS — I10 ESSENTIAL HYPERTENSION: ICD-10-CM

## 2022-01-20 DIAGNOSIS — Z12.31 OTHER SCREENING MAMMOGRAM: ICD-10-CM

## 2022-01-20 DIAGNOSIS — F41.9 ANXIETY: ICD-10-CM

## 2022-01-20 DIAGNOSIS — E03.9 HYPOTHYROIDISM, UNSPECIFIED TYPE: Primary | ICD-10-CM

## 2022-01-20 DIAGNOSIS — M75.81 RIGHT ROTATOR CUFF TENDINITIS: ICD-10-CM

## 2022-01-20 DIAGNOSIS — H65.21 RIGHT CHRONIC SEROUS OTITIS MEDIA: Primary | ICD-10-CM

## 2022-01-20 PROCEDURE — 99214 PR OFFICE/OUTPT VISIT, EST, LEVL IV, 30-39 MIN: ICD-10-PCS | Mod: 95,,, | Performed by: FAMILY MEDICINE

## 2022-01-20 PROCEDURE — 3078F DIAST BP <80 MM HG: CPT | Mod: CPTII,95,, | Performed by: FAMILY MEDICINE

## 2022-01-20 PROCEDURE — 3074F PR MOST RECENT SYSTOLIC BLOOD PRESSURE < 130 MM HG: ICD-10-PCS | Mod: CPTII,95,, | Performed by: FAMILY MEDICINE

## 2022-01-20 PROCEDURE — 3078F PR MOST RECENT DIASTOLIC BLOOD PRESSURE < 80 MM HG: ICD-10-PCS | Mod: CPTII,95,, | Performed by: FAMILY MEDICINE

## 2022-01-20 PROCEDURE — 99214 OFFICE O/P EST MOD 30 MIN: CPT | Mod: 95,,, | Performed by: FAMILY MEDICINE

## 2022-01-20 PROCEDURE — 3074F SYST BP LT 130 MM HG: CPT | Mod: CPTII,95,, | Performed by: FAMILY MEDICINE

## 2022-01-20 NOTE — PROGRESS NOTES
Subjective:       Patient ID: Odette Hewitt is a 76 y.o. female.    Chief Complaint: No chief complaint on file.    HPI     The patient location is: [place]  The chief complaint leading to consultation is: anxiety    Visit type: audiovisual    Face to Face time with patient:   20 minutes of total time spent on the encounter, which includes face to face time and non-face to face time preparing to see the patient (eg, review of tests), Obtaining and/or reviewing separately obtained history, Documenting clinical information in the electronic or other health record, Independently interpreting results (not separately reported) and communicating results to the patient/family/caregiver, or Care coordination (not separately reported).         Each patient to whom he or she provides medical services by telemedicine is:  (1) informed of the relationship between the physician and patient and the respective role of any other health care provider with respect to management of the patient; and (2) notified that he or she may decline to receive medical services by telemedicine and may withdraw from such care at any time.    Notes:     Tested negative for covid 19 on 12/29/21 for sinus congestion.  Reports no sinus congestion currently.     htn stable.      Hypothyroidism stable.      hld stable.      Anxiety stable.      C/o right shoulder pain x 10 months after 2nd dose of pfizer.  slighly better but pain with internal and ext rotation.        Review of Systems    Objective:      Physical Exam    Assessment:       1. Hypothyroidism, unspecified type    2. Essential hypertension    3. Other screening mammogram    4. Anxiety    5. Right rotator cuff tendinitis        Plan:       Hypothyroidism, unspecified type  -     Comprehensive Metabolic Panel; Future  -     Lipid Panel; Future  -     TSH; Future    Essential hypertension    Other screening mammogram  -     Mammo Digital Screening Bilat; Future    Anxiety    Right rotator cuff  tendinitis                  Plan:  Cont current meds  Recommend shoulder exercises    Medication List with Changes/Refills   Current Medications    ACYCLOVIR 5% (ZOVIRAX) 5 % OINTMENT    Apply topically 5 (five) times daily.    ATENOLOL (TENORMIN) 25 MG TABLET    TAKE 1 TABLET BY MOUTH EVERY DAY    CALCIPOTRIENE-BETAMETHASONE (TACLONEX) OINTMENT    Apply topically once daily.    ESCITALOPRAM OXALATE (LEXAPRO) 20 MG TABLET    Take 1 tablet (20 mg total) by mouth once daily.    FENOFIBRATE 160 MG TAB    Take 1 tablet (160 mg total) by mouth once daily.    FISH OIL-OMEGA-3 FATTY ACIDS 300-1,000 MG CAPSULE    Take 2 g by mouth once daily.    HYDROCORTISONE 2.5 % CREAM    Apply topically 2 (two) times daily. To small area, thin coat only, smallest area needed    LEVOTHYROXINE (SYNTHROID) 75 MCG TABLET    TAKE 1 TABLET BY MOUTH EVERY DAY    NORTRIPTYLINE (PAMELOR) 25 MG CAPSULE    Take 1 capsule (25 mg total) by mouth every evening.    PAZEO 0.7 % DROP    INSTILL 1 DROP INTO BOTH EYES ONCE A DAY FOR ITCHY EYES

## 2022-01-25 ENCOUNTER — OFFICE VISIT (OUTPATIENT)
Dept: OTOLARYNGOLOGY | Facility: CLINIC | Age: 77
End: 2022-01-25
Payer: MEDICARE

## 2022-01-25 VITALS — WEIGHT: 156.31 LBS | BODY MASS INDEX: 26.83 KG/M2

## 2022-01-25 DIAGNOSIS — H69.93 ETD (EUSTACHIAN TUBE DYSFUNCTION), BILATERAL: ICD-10-CM

## 2022-01-25 DIAGNOSIS — H90.3 BILATERAL SENSORINEURAL HEARING LOSS: ICD-10-CM

## 2022-01-25 DIAGNOSIS — H65.21 RIGHT CHRONIC SEROUS OTITIS MEDIA: Primary | ICD-10-CM

## 2022-01-25 PROCEDURE — 1159F PR MEDICATION LIST DOCUMENTED IN MEDICAL RECORD: ICD-10-PCS | Mod: CPTII,S$GLB,, | Performed by: OTOLARYNGOLOGY

## 2022-01-25 PROCEDURE — 3288F FALL RISK ASSESSMENT DOCD: CPT | Mod: CPTII,S$GLB,, | Performed by: OTOLARYNGOLOGY

## 2022-01-25 PROCEDURE — 1159F MED LIST DOCD IN RCRD: CPT | Mod: CPTII,S$GLB,, | Performed by: OTOLARYNGOLOGY

## 2022-01-25 PROCEDURE — 69433 PR CREATE EARDRUM OPENING,LOCAL ANESTH: ICD-10-PCS | Mod: RT,S$GLB,, | Performed by: OTOLARYNGOLOGY

## 2022-01-25 PROCEDURE — 1160F RVW MEDS BY RX/DR IN RCRD: CPT | Mod: CPTII,S$GLB,, | Performed by: OTOLARYNGOLOGY

## 2022-01-25 PROCEDURE — 1126F PR PAIN SEVERITY QUANTIFIED, NO PAIN PRESENT: ICD-10-PCS | Mod: CPTII,S$GLB,, | Performed by: OTOLARYNGOLOGY

## 2022-01-25 PROCEDURE — 3288F PR FALLS RISK ASSESSMENT DOCUMENTED: ICD-10-PCS | Mod: CPTII,S$GLB,, | Performed by: OTOLARYNGOLOGY

## 2022-01-25 PROCEDURE — 1101F PT FALLS ASSESS-DOCD LE1/YR: CPT | Mod: CPTII,S$GLB,, | Performed by: OTOLARYNGOLOGY

## 2022-01-25 PROCEDURE — 1160F PR REVIEW ALL MEDS BY PRESCRIBER/CLIN PHARMACIST DOCUMENTED: ICD-10-PCS | Mod: CPTII,S$GLB,, | Performed by: OTOLARYNGOLOGY

## 2022-01-25 PROCEDURE — 69433 CREATE EARDRUM OPENING: CPT | Mod: RT,S$GLB,, | Performed by: OTOLARYNGOLOGY

## 2022-01-25 PROCEDURE — 1101F PR PT FALLS ASSESS DOC 0-1 FALLS W/OUT INJ PAST YR: ICD-10-PCS | Mod: CPTII,S$GLB,, | Performed by: OTOLARYNGOLOGY

## 2022-01-25 PROCEDURE — 1126F AMNT PAIN NOTED NONE PRSNT: CPT | Mod: CPTII,S$GLB,, | Performed by: OTOLARYNGOLOGY

## 2022-01-25 PROCEDURE — 99214 OFFICE O/P EST MOD 30 MIN: CPT | Mod: 25,S$GLB,, | Performed by: OTOLARYNGOLOGY

## 2022-01-25 PROCEDURE — 99214 PR OFFICE/OUTPT VISIT, EST, LEVL IV, 30-39 MIN: ICD-10-PCS | Mod: 25,S$GLB,, | Performed by: OTOLARYNGOLOGY

## 2022-01-25 PROCEDURE — 99999 PR PBB SHADOW E&M-EST. PATIENT-LVL III: CPT | Mod: PBBFAC,,, | Performed by: OTOLARYNGOLOGY

## 2022-01-25 PROCEDURE — 99999 PR PBB SHADOW E&M-EST. PATIENT-LVL III: ICD-10-PCS | Mod: PBBFAC,,, | Performed by: OTOLARYNGOLOGY

## 2022-01-27 NOTE — PROGRESS NOTES
Subjective:       Patient ID: Odette Hewitt is a 76 y.o. female.    Chief Complaint: ears clogged  (Since may / pt is ready for tubes)      Odette is here for follow-up of R>L ETD, chronic R OME and B SNHL. She felt brief improveent with myringotomy on the right. She continues to have bilateral ear muffled sensation, decr hearing, and sensation of off balance.    Patient validated questionnaires (if applicable):      %       No flowsheet data found.  No flowsheet data found.  No flowsheet data found.         Review of Systems   Constitutional: Negative for activity change and appetite change.   Respiratory: Negative for difficulty breathing and wheezing   Cardiovascular: Negative for chest pain.      Objective:        Constitutional:   Vital signs are normal. She appears well-developed and well-nourished.     Head:  Normocephalic and atraumatic.     Ears:  Hearing normal to normal and whispered voice; external ear normal without scars, lesions, or masses; ear canal, tympanic membrane, and middle ear normal..   Right Ear: Tympanic membrane is retracted. A middle ear effusion is present.   Left Ear:  No middle ear effusion.     Nose:  Nose normal including turbinates, nasal mucosa, sinuses and nasal septum.     Mouth/Throat  Oropharynx clear and moist without lesions or asymmetry.     Neck:  Neck normal without thyromegaly masses, asymmetry, normal tracheal structure, crepitus, and tenderness.         Audiogram reviewed:  Mild to moderate B SNHL with slight Right conductive component    Tests / Results:  Odette Hewitt   [id number],   : 1945      Procedure date:2022  Patient's medications, allergies, past medical, surgical, social and family histories were reviewed and updated as appropriate.    Pre-procedure diagnosis: Right chronic serous otitis media [H65.21]     Procedure: right  tympanostomy, with ventilation tube placement     Procedure in detail: Risks, benefits, and alternatives of the  procedure were discussed with the patient, and consent was obtained to perform for the right ear.  The procedure required a high level of expertise and use of an operating microscope and multiple micro-instruments.     With the patient in the supine position, the operating microscope was used to examine the ear(s)  A variety of sterile, micro-instruments were utilized to remove the cerumen atraumatically. I applied a small amount of phenol topically (local analgesic) to the tympanic membrane.  The tympanic membrane was incised radially in the appropriate location.  I suctioned the middle ear through the incision. A ventilation tube was placed (position & patency confirmed).  I performed the procedure. The patient tolerated the procedure well and there were no complications.    Findings:   Right ear : serous effusion      Assessment:       1. Right chronic serous otitis media    2. ETD (Eustachian tube dysfunction), bilateral          Plan:         Extensive discussion. I cannot guarantee that tube will make her symptoms better, because her complaints are bilateral and effusion is only the right. She has bilateral SNHL with a slight R conductive component.  She is against HA right now.  After discussion of r/b/a, she will trial tube  RTC 1 mo

## 2022-02-22 ENCOUNTER — PATIENT MESSAGE (OUTPATIENT)
Dept: FAMILY MEDICINE | Facility: CLINIC | Age: 77
End: 2022-02-22
Payer: MEDICARE

## 2022-03-07 ENCOUNTER — OFFICE VISIT (OUTPATIENT)
Dept: FAMILY MEDICINE | Facility: CLINIC | Age: 77
End: 2022-03-07
Payer: MEDICARE

## 2022-03-07 ENCOUNTER — TELEPHONE (OUTPATIENT)
Dept: FAMILY MEDICINE | Facility: CLINIC | Age: 77
End: 2022-03-07

## 2022-03-07 VITALS
WEIGHT: 158.06 LBS | DIASTOLIC BLOOD PRESSURE: 80 MMHG | RESPIRATION RATE: 18 BRPM | SYSTOLIC BLOOD PRESSURE: 118 MMHG | HEIGHT: 64 IN | HEART RATE: 86 BPM | BODY MASS INDEX: 26.98 KG/M2 | OXYGEN SATURATION: 99 %

## 2022-03-07 DIAGNOSIS — S91.209D AVULSION OF TOENAIL, SUBSEQUENT ENCOUNTER: Primary | ICD-10-CM

## 2022-03-07 PROCEDURE — 3288F PR FALLS RISK ASSESSMENT DOCUMENTED: ICD-10-PCS | Mod: CPTII,S$GLB,, | Performed by: FAMILY MEDICINE

## 2022-03-07 PROCEDURE — 1159F MED LIST DOCD IN RCRD: CPT | Mod: CPTII,S$GLB,, | Performed by: FAMILY MEDICINE

## 2022-03-07 PROCEDURE — 1101F PR PT FALLS ASSESS DOC 0-1 FALLS W/OUT INJ PAST YR: ICD-10-PCS | Mod: CPTII,S$GLB,, | Performed by: FAMILY MEDICINE

## 2022-03-07 PROCEDURE — 3288F FALL RISK ASSESSMENT DOCD: CPT | Mod: CPTII,S$GLB,, | Performed by: FAMILY MEDICINE

## 2022-03-07 PROCEDURE — 3079F PR MOST RECENT DIASTOLIC BLOOD PRESSURE 80-89 MM HG: ICD-10-PCS | Mod: CPTII,S$GLB,, | Performed by: FAMILY MEDICINE

## 2022-03-07 PROCEDURE — 3074F SYST BP LT 130 MM HG: CPT | Mod: CPTII,S$GLB,, | Performed by: FAMILY MEDICINE

## 2022-03-07 PROCEDURE — 1159F PR MEDICATION LIST DOCUMENTED IN MEDICAL RECORD: ICD-10-PCS | Mod: CPTII,S$GLB,, | Performed by: FAMILY MEDICINE

## 2022-03-07 PROCEDURE — 1160F RVW MEDS BY RX/DR IN RCRD: CPT | Mod: CPTII,S$GLB,, | Performed by: FAMILY MEDICINE

## 2022-03-07 PROCEDURE — 3074F PR MOST RECENT SYSTOLIC BLOOD PRESSURE < 130 MM HG: ICD-10-PCS | Mod: CPTII,S$GLB,, | Performed by: FAMILY MEDICINE

## 2022-03-07 PROCEDURE — 3079F DIAST BP 80-89 MM HG: CPT | Mod: CPTII,S$GLB,, | Performed by: FAMILY MEDICINE

## 2022-03-07 PROCEDURE — 99999 PR PBB SHADOW E&M-EST. PATIENT-LVL III: CPT | Mod: PBBFAC,,, | Performed by: FAMILY MEDICINE

## 2022-03-07 PROCEDURE — 1126F PR PAIN SEVERITY QUANTIFIED, NO PAIN PRESENT: ICD-10-PCS | Mod: CPTII,S$GLB,, | Performed by: FAMILY MEDICINE

## 2022-03-07 PROCEDURE — 99213 OFFICE O/P EST LOW 20 MIN: CPT | Mod: S$GLB,,, | Performed by: FAMILY MEDICINE

## 2022-03-07 PROCEDURE — 99999 PR PBB SHADOW E&M-EST. PATIENT-LVL III: ICD-10-PCS | Mod: PBBFAC,,, | Performed by: FAMILY MEDICINE

## 2022-03-07 PROCEDURE — 1101F PT FALLS ASSESS-DOCD LE1/YR: CPT | Mod: CPTII,S$GLB,, | Performed by: FAMILY MEDICINE

## 2022-03-07 PROCEDURE — 1160F PR REVIEW ALL MEDS BY PRESCRIBER/CLIN PHARMACIST DOCUMENTED: ICD-10-PCS | Mod: CPTII,S$GLB,, | Performed by: FAMILY MEDICINE

## 2022-03-07 PROCEDURE — 99213 PR OFFICE/OUTPT VISIT, EST, LEVL III, 20-29 MIN: ICD-10-PCS | Mod: S$GLB,,, | Performed by: FAMILY MEDICINE

## 2022-03-07 PROCEDURE — 1126F AMNT PAIN NOTED NONE PRSNT: CPT | Mod: CPTII,S$GLB,, | Performed by: FAMILY MEDICINE

## 2022-03-07 NOTE — PROGRESS NOTES
HPI: She hurt her toe on a cuise. Lost the great toenail of the right great toe. No major pain or infection. She has been on Augmentin and will be on it for a total of 14 days. She has no systemic symptoms.     Labs are due.          Review of Systems   Constitutional: Negative.    HENT: Negative.    Eyes: Negative.    Respiratory: Negative.    Cardiovascular: Negative.    Gastrointestinal: Negative.    Genitourinary: Negative.    Musculoskeletal: Negative.    Skin: Negative.    Neurological: Negative.    Psychiatric/Behavioral: Negative.         Physical Exam  Constitutional:       Appearance: Normal appearance.   HENT:      Head: Normocephalic and atraumatic.      Nose: Nose normal.      Mouth/Throat:      Mouth: Mucous membranes are dry.      Pharynx: Oropharynx is clear.   Eyes:      Extraocular Movements: Extraocular movements intact.      Pupils: Pupils are equal, round, and reactive to light.   Cardiovascular:      Rate and Rhythm: Normal rate and regular rhythm.      Pulses: Normal pulses.      Heart sounds: Normal heart sounds.   Pulmonary:      Effort: Pulmonary effort is normal.      Breath sounds: Normal breath sounds.   Abdominal:      General: Abdomen is flat. Bowel sounds are normal.      Palpations: Abdomen is soft.   Musculoskeletal:         General: Normal range of motion.      Cervical back: Normal range of motion and neck supple.   Skin:     General: Skin is warm and dry.      Comments: Toenail missing on the right great toe. No sign of infection.   Neurological:      General: No focal deficit present.      Mental Status: She is alert and oriented to person, place, and time.   Psychiatric:         Mood and Affect: Mood normal.         Behavior: Behavior normal.         Thought Content: Thought content normal.         Judgment: Judgment normal.          Avulsion of toenail, subsequent encounter  The toenail will return, complete the antibiotic, and use the ointment you have 1-2 times a day.

## 2022-03-07 NOTE — TELEPHONE ENCOUNTER
----- Message from Astrid Pérez sent at 3/5/2022 11:40 AM CST -----  Contact: pt  Type:  Same Day Appointment Request    Caller is requesting a same day appointment.  Caller declined first available appointment listed below.      Name of Caller: pt  When is the first available appointment?  3/15/22  Symptoms: bumped big toe, toenail nail sticking up infected  Best Call Back Number: [phone]  Additional Information:   pt would like to be seen 3/7/22

## 2022-03-10 ENCOUNTER — HOSPITAL ENCOUNTER (OUTPATIENT)
Dept: RADIOLOGY | Facility: HOSPITAL | Age: 77
Discharge: HOME | End: 2022-03-10
Attending: FAMILY MEDICINE
Payer: MEDICARE

## 2022-03-10 DIAGNOSIS — Z12.31 OTHER SCREENING MAMMOGRAM: ICD-10-CM

## 2022-03-10 PROCEDURE — 77067 SCR MAMMO BI INCL CAD: CPT | Mod: 26,,, | Performed by: RADIOLOGY

## 2022-03-10 PROCEDURE — 77067 MAMMO DIGITAL SCREENING BILAT WITH TOMO: ICD-10-PCS | Mod: 26,,, | Performed by: RADIOLOGY

## 2022-03-10 PROCEDURE — 77063 BREAST TOMOSYNTHESIS BI: CPT | Mod: TC,PO

## 2022-03-10 PROCEDURE — 77063 MAMMO DIGITAL SCREENING BILAT WITH TOMO: ICD-10-PCS | Mod: 26,,, | Performed by: RADIOLOGY

## 2022-03-10 PROCEDURE — 77063 BREAST TOMOSYNTHESIS BI: CPT | Mod: 26,,, | Performed by: RADIOLOGY

## 2022-03-10 PROCEDURE — 77067 SCR MAMMO BI INCL CAD: CPT | Mod: TC,PO

## 2022-04-08 NOTE — TELEPHONE ENCOUNTER
No new care gaps identified.  Powered by [employer]. Reference number: [id number].   4/08/2022 4:46:02 PM CDT

## 2022-04-11 RX ORDER — NORTRIPTYLINE HYDROCHLORIDE 25 MG/1
CAPSULE ORAL
Qty: 90 CAPSULE | Refills: 2 | Status: SHIPPED | OUTPATIENT
Start: 2022-04-11 | End: 2022-12-02

## 2022-04-11 NOTE — TELEPHONE ENCOUNTER
Refill Authorization Note   Odette Hewitt  is requesting a refill authorization.  Brief Assessment and Rationale for Refill:  Approve     Medication Therapy Plan:       Medication Reconciliation Completed: No   Comments:     No Care Gaps recommended.     Note composed:7:39 AM 04/11/2022

## 2022-04-14 RX ORDER — ATENOLOL 25 MG/1
TABLET ORAL
Qty: 90 TABLET | Refills: 3 | Status: SHIPPED | OUTPATIENT
Start: 2022-04-14 | End: 2023-04-11

## 2022-04-14 NOTE — TELEPHONE ENCOUNTER
No new care gaps identified.  Powered by [employer]. Reference number: [id number].   4/14/2022 12:26:11 PM CDT

## 2022-04-14 NOTE — TELEPHONE ENCOUNTER
Refill Authorization Note   Odette Hewitt  is requesting a refill authorization.  Brief Assessment and Rationale for Refill:  Approve     Medication Therapy Plan:       Medication Reconciliation Completed: No   Comments:     No Care Gaps recommended.     Note composed:2:05 PM 04/14/2022

## 2022-05-09 ENCOUNTER — PATIENT MESSAGE (OUTPATIENT)
Dept: SMOKING CESSATION | Facility: CLINIC | Age: 77
End: 2022-05-09
Payer: MEDICARE

## 2022-10-03 ENCOUNTER — TELEPHONE (OUTPATIENT)
Dept: FAMILY MEDICINE | Facility: CLINIC | Age: 77
End: 2022-10-03
Payer: MEDICARE

## 2022-10-03 NOTE — TELEPHONE ENCOUNTER
Pt wants to know if she has to get colonoscopy. She states that she is 77 and doesn't want one    Please advise

## 2022-10-03 NOTE — TELEPHONE ENCOUNTER
----- Message from Castillo Decker sent at 10/3/2022  8:37 AM CDT -----  Contact: pt at [phone]  Type: Needs Medical Advice  Who Called:  pt    Best Call Back Number: [phone]    Additional Information: pt is calling the office requesting a call back in regards to a scope. Please call back and advise.

## 2022-12-05 ENCOUNTER — PES CALL (OUTPATIENT)
Dept: ADMINISTRATIVE | Facility: CLINIC | Age: 77
End: 2022-12-05
Payer: MEDICARE

## 2023-02-26 NOTE — TELEPHONE ENCOUNTER
No new care gaps identified.  [facility] Embedded Care Gaps. Reference number: [id number]. 2/26/2023   3:20:22 PM CST

## 2023-02-27 RX ORDER — ESCITALOPRAM OXALATE 20 MG/1
20 TABLET ORAL DAILY
Qty: 90 TABLET | Refills: 0 | Status: SHIPPED | OUTPATIENT
Start: 2023-02-27 | End: 2023-04-23

## 2023-02-27 NOTE — TELEPHONE ENCOUNTER
Refill Decision Note   Odette Hewitt  is requesting a refill authorization.  Brief Assessment and Rationale for Refill:  Approve     Medication Therapy Plan:       Medication Reconciliation Completed: No    Comments:     No Care Gaps recommended.     Note composed:10:34 AM 02/27/2023

## 2023-03-17 NOTE — TELEPHONE ENCOUNTER
----- Message from Carmita Ronquillo sent at 1/5/2017 11:40 AM CST -----  Contact: patient  Patient calling in regards to a refill. She would like a refill on her thyroid medication, 90 day supply. Please advise.  Call back .  Thanks!  [facility] [address] [facility] [employer] - [place] - [address]  [address]  [place] [address]  Phone: [phone] Fax: [phone]       BMI: BMI (kg/m2): 27.1 (03-13-23 @ 12:49)  HbA1c: A1C with Estimated Average Glucose Result: 5.4 % (03-13-23 @ 08:00)    Glucose: POCT Blood Glucose.: 101 mg/dL (03-11-23 @ 17:05)    BP: --  Lipid Panel: Date/Time: 03-13-23 @ 08:00  Cholesterol, Serum: 158  Direct LDL: --  HDL Cholesterol, Serum: 66  Total Cholesterol/HDL Ration Measurement: --  Triglycerides, Serum: 55

## 2023-03-21 ENCOUNTER — PES CALL (OUTPATIENT)
Dept: ADMINISTRATIVE | Facility: CLINIC | Age: 78
End: 2023-03-21
Payer: MEDICARE

## 2023-03-21 ENCOUNTER — TELEPHONE (OUTPATIENT)
Dept: FAMILY MEDICINE | Facility: CLINIC | Age: 78
End: 2023-03-21
Payer: MEDICARE

## 2023-03-21 NOTE — TELEPHONE ENCOUNTER
----- Message from Nishant Alexander sent at 3/21/2023 11:09 AM CDT -----  Pt requesting a call back for sooner appt.

## 2023-03-23 ENCOUNTER — OFFICE VISIT (OUTPATIENT)
Dept: OTOLARYNGOLOGY | Facility: CLINIC | Age: 78
End: 2023-03-23
Payer: MEDICARE

## 2023-03-23 VITALS — WEIGHT: 162.5 LBS | BODY MASS INDEX: 27.74 KG/M2 | HEIGHT: 64 IN

## 2023-03-23 DIAGNOSIS — H69.93 ETD (EUSTACHIAN TUBE DYSFUNCTION), BILATERAL: ICD-10-CM

## 2023-03-23 DIAGNOSIS — R09.81 NASAL CONGESTION: ICD-10-CM

## 2023-03-23 DIAGNOSIS — H65.21 RIGHT CHRONIC SEROUS OTITIS MEDIA: Primary | ICD-10-CM

## 2023-03-23 PROCEDURE — 1101F PT FALLS ASSESS-DOCD LE1/YR: CPT | Mod: CPTII,S$GLB,, | Performed by: STUDENT IN AN ORGANIZED HEALTH CARE EDUCATION/TRAINING PROGRAM

## 2023-03-23 PROCEDURE — 99999 PR PBB SHADOW E&M-EST. PATIENT-LVL III: CPT | Mod: PBBFAC,,, | Performed by: STUDENT IN AN ORGANIZED HEALTH CARE EDUCATION/TRAINING PROGRAM

## 2023-03-23 PROCEDURE — 99214 OFFICE O/P EST MOD 30 MIN: CPT | Mod: S$GLB,,, | Performed by: STUDENT IN AN ORGANIZED HEALTH CARE EDUCATION/TRAINING PROGRAM

## 2023-03-23 PROCEDURE — 99999 PR PBB SHADOW E&M-EST. PATIENT-LVL III: ICD-10-PCS | Mod: PBBFAC,,, | Performed by: STUDENT IN AN ORGANIZED HEALTH CARE EDUCATION/TRAINING PROGRAM

## 2023-03-23 PROCEDURE — 1159F MED LIST DOCD IN RCRD: CPT | Mod: CPTII,S$GLB,, | Performed by: STUDENT IN AN ORGANIZED HEALTH CARE EDUCATION/TRAINING PROGRAM

## 2023-03-23 PROCEDURE — 1126F PR PAIN SEVERITY QUANTIFIED, NO PAIN PRESENT: ICD-10-PCS | Mod: CPTII,S$GLB,, | Performed by: STUDENT IN AN ORGANIZED HEALTH CARE EDUCATION/TRAINING PROGRAM

## 2023-03-23 PROCEDURE — 1126F AMNT PAIN NOTED NONE PRSNT: CPT | Mod: CPTII,S$GLB,, | Performed by: STUDENT IN AN ORGANIZED HEALTH CARE EDUCATION/TRAINING PROGRAM

## 2023-03-23 PROCEDURE — 3288F FALL RISK ASSESSMENT DOCD: CPT | Mod: CPTII,S$GLB,, | Performed by: STUDENT IN AN ORGANIZED HEALTH CARE EDUCATION/TRAINING PROGRAM

## 2023-03-23 PROCEDURE — 1101F PR PT FALLS ASSESS DOC 0-1 FALLS W/OUT INJ PAST YR: ICD-10-PCS | Mod: CPTII,S$GLB,, | Performed by: STUDENT IN AN ORGANIZED HEALTH CARE EDUCATION/TRAINING PROGRAM

## 2023-03-23 PROCEDURE — 99214 PR OFFICE/OUTPT VISIT, EST, LEVL IV, 30-39 MIN: ICD-10-PCS | Mod: S$GLB,,, | Performed by: STUDENT IN AN ORGANIZED HEALTH CARE EDUCATION/TRAINING PROGRAM

## 2023-03-23 PROCEDURE — 1159F PR MEDICATION LIST DOCUMENTED IN MEDICAL RECORD: ICD-10-PCS | Mod: CPTII,S$GLB,, | Performed by: STUDENT IN AN ORGANIZED HEALTH CARE EDUCATION/TRAINING PROGRAM

## 2023-03-23 PROCEDURE — 3288F PR FALLS RISK ASSESSMENT DOCUMENTED: ICD-10-PCS | Mod: CPTII,S$GLB,, | Performed by: STUDENT IN AN ORGANIZED HEALTH CARE EDUCATION/TRAINING PROGRAM

## 2023-03-23 RX ORDER — AZELASTINE 1 MG/ML
1 SPRAY, METERED NASAL 2 TIMES DAILY
Qty: 30 ML | Refills: 11 | Status: SHIPPED | OUTPATIENT
Start: 2023-03-23 | End: 2024-03-22

## 2023-03-23 RX ORDER — FLUTICASONE PROPIONATE 50 MCG
1 SPRAY, SUSPENSION (ML) NASAL 2 TIMES DAILY
Qty: 16 G | Refills: 11 | Status: SHIPPED | OUTPATIENT
Start: 2023-03-23

## 2023-03-23 RX ORDER — INFLUENZA A VIRUS A/VICTORIA/2570/2019 IVR-215 (H1N1) ANTIGEN (FORMALDEHYDE INACTIVATED), INFLUENZA A VIRUS A/DARWIN/9/2021 SAN-010 (H3N2) ANTIGEN (FORMALDEHYDE INACTIVATED), INFLUENZA B VIRUS B/PHUKET/3073/2013 ANTIGEN (FORMALDEHYDE INACTIVATED), AND INFLUENZA B VIRUS B/MICHIGAN/01/2021 ANTIGEN (FORMALDEHYDE INACTIVATED) 60; 60; 60; 60 UG/.7ML; UG/.7ML; UG/.7ML; UG/.7ML
INJECTION, SUSPENSION INTRAMUSCULAR
COMMUNITY
Start: 2022-10-18 | End: 2023-11-07

## 2023-03-23 RX ORDER — LEVOCETIRIZINE DIHYDROCHLORIDE 5 MG/1
5 TABLET, FILM COATED ORAL NIGHTLY
Qty: 30 TABLET | Refills: 11 | Status: SHIPPED | OUTPATIENT
Start: 2023-03-23 | End: 2024-01-29 | Stop reason: SDUPTHER

## 2023-03-23 NOTE — PATIENT INSTRUCTIONS
Try 2 different nasal sprays twice a day- flonase (nasal steroid) and astelin (antihistamine)  Take daily anithistamine- xyzal  Pop ears at least 3 times a day- hold nose and exhale with closed mouth.     If nose over dries, add saline sprays before medicated sprays.

## 2023-03-23 NOTE — PROGRESS NOTES
Otolaryngology Clinic Note    Subjective:       Patient ID: Odette Hewitt is a 77 y.o. female.    Chief Complaint: Ear Fullness (Wants tube taken out of ear) and Hearing Loss      History of Present Illness: Odette Hewitt is a 77 y.o. female presenting with concerns about ear. Does not think tube on right helped at all. Did not help with fullness at all. Right ear has been worse than left. Feels underwater. Can pop, right with gurgles. Some sinus issues now. Cannot breathe at night. No drainage. Hearing seems stable. Mouth breathes at night. Has tried nasal sprays. Does not use regularly. No CPAP. Ears are always stuffy, not related to nose. Has been dizzy once. Has had this every few years in past as well. Has had steroids, tubes, etc in past. This time lasting 3 years. Reports once has left paralysis with steroid shot.   Dr. Medina placed tube on right for effusion 1/2022. Has visits here going back to 2014 for same issue. 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      Past Surgical History:   Procedure Laterality Date    HYSTERECTOMY      TONSILLECTOMY      TYMPANOSTOMY TUBE PLACEMENT       Past Medical History:   Diagnosis Date    Chronic arm pain     on pamelor    HTN (hypertension)     Hyperlipidemia     Hypothyroid 2006    Osteopenia     Refusal of blood transfusions as patient is [facility]      Social Determinants of Health     Tobacco Use: Low Risk     Smoking Tobacco Use: Never    Smokeless Tobacco Use: Never    Passive Exposure: Not on file   Alcohol Use: Not on file   Financial Resource Strain: Not on file   Food Insecurity: Not on file   Transportation Needs: Not on file   Physical Activity: Not on file   Stress: Not on file   Social Connections: Not on file   Housing Stability: Not on file   Depression: Not on file     Review of patient's allergies indicates:   Allergen Reactions    Dexamethasone      Other reaction(s): Syncope  Other reaction(s):  Muscle pain  Other reaction(s): Joint pain    Erythromycin      Other reaction(s): chest pain  Rash (skin)^  Rash (skin)^      Penicillins      Rash (skin)^    Simvastatin Other (See Comments)     Leg cramps     Current Outpatient Medications   Medication Instructions    acyclovir 5% (ZOVIRAX) 5 % ointment Topical (Top), 5 times daily    atenoloL (TENORMIN) 25 MG tablet TAKE 1 TABLET BY MOUTH EVERY DAY    azelastine (ASTELIN) 137 mcg, Nasal, 2 times daily    calcipotriene-betamethasone (TACLONEX) ointment Topical (Top), Daily    EScitalopram oxalate (LEXAPRO) 20 mg, Oral, Daily, Due for annual with PCP    fenofibrate 160 MG Tab TAKE 1 TABLET BY MOUTH ONCE DAILY.    fish oil-omega-3 fatty acids 300-1,000 mg capsule 2 g, Oral, Daily    fluticasone propionate (FLONASE) 50 mcg, Each Nostril, 2 times daily    FLUZONE HIGHDOSE QUAD 22-23  mcg/0.7 mL Syrg No dose, route, or frequency recorded.    hydrocortisone 2.5 % cream Topical (Top), 2 times daily, To small area, thin coat only, smallest area needed    levocetirizine (XYZAL) 5 mg, Oral, Nightly    levothyroxine (SYNTHROID) 75 MCG tablet TAKE 1 TABLET BY MOUTH EVERY DAY    nortriptyline (PAMELOR) 25 MG capsule TAKE 1 CAPSULE BY MOUTH EVERY EVENING.    PAZEO 0.7 % Drop INSTILL 1 DROP INTO BOTH EYES ONCE A DAY FOR ITCHY EYES         ENT ROS negative except as stated above.     Patient answers are not available for this visit.            Objective:      There were no vitals filed for this visit.    General: NAD, well appearing  Eyes: Normal conjunctiva and lids  Face: symmetric, nerve intact  Nose: The nose is without any evidence of any deformity. The nasal mucosa is moist. The septum is midline, spur on left on floor and small on right. There is no evidence of septal hematoma. The turbinates are without abnormality.   Ears: The ears are with normal-appearing pinna. Examination of the canals is normal appearing bilaterally. There is no drainage or erythema noted. .  Hearing is grossly intact.  Otomicroscopy: Right serous effusion, tube removed from canal, left normal. Valsalva +.   Mouth: No obvious abnormalities to the lips. The teeth are unremarkable. The gingivae are without any obvious evidence of infection or lesion. The oral mucosa is moist and pink. There are no obvious masses to the hard or soft palate.   Oropharynx: The uvula is midline.  The tongue is midline. The posterior pharynx is without erythema or exudate. The tonsils are normal appearing.  Salivary glands: The salivary glands are symmetric and not enlarged, no masses  Neck: No lymphadenopathy, trachea midline, thryoid not enlarged.  Psych: Normal mood and affect.   Neuro: Grossly intact  Speech: fluent         Assessment and Plan:       1. Right chronic serous otitis media    2. ETD (Eustachian tube dysfunction), bilateral    3. Nasal congestion        Reviewed ETD and anatomy, goal of medications is to reduce inflammation in nose and eustachian tube.   Flonase/astelin twice a day  Daily xyzal    RTC: Prn per her request    Plan of care was discussed in detail with the patient, who agreed with the plan as above. All questions were answered in detail.     Yael Haque MD  Otolaryngology

## 2023-05-24 ENCOUNTER — TELEPHONE (OUTPATIENT)
Dept: FAMILY MEDICINE | Facility: CLINIC | Age: 78
End: 2023-05-24
Payer: MEDICARE

## 2023-05-24 NOTE — TELEPHONE ENCOUNTER
----- Message from Carrie Medina sent at 5/24/2023  1:06 PM CDT -----  Contact: Patient  Type:  Patient Needs Advice    Who Called:  Patient  What is this regarding?:  Pt has an appt and wants some blood work ordered for before it. She also wants a mammo ordered.  Best Call Back Number:  [phone]  Additional Information:  Please call the patient back at the phone number listed above to advise. Thank you!

## 2023-05-26 ENCOUNTER — LAB VISIT (OUTPATIENT)
Dept: LAB | Facility: HOSPITAL | Age: 78
End: 2023-05-26
Attending: FAMILY MEDICINE
Payer: MEDICARE

## 2023-05-26 ENCOUNTER — OFFICE VISIT (OUTPATIENT)
Dept: FAMILY MEDICINE | Facility: CLINIC | Age: 78
End: 2023-05-26
Payer: MEDICARE

## 2023-05-26 VITALS
SYSTOLIC BLOOD PRESSURE: 120 MMHG | BODY MASS INDEX: 27.33 KG/M2 | DIASTOLIC BLOOD PRESSURE: 82 MMHG | WEIGHT: 160.06 LBS | TEMPERATURE: 99 F | HEART RATE: 63 BPM | OXYGEN SATURATION: 95 % | HEIGHT: 64 IN

## 2023-05-26 DIAGNOSIS — D69.2 OTHER NONTHROMBOCYTOPENIC PURPURA: ICD-10-CM

## 2023-05-26 DIAGNOSIS — E03.9 HYPOTHYROIDISM, UNSPECIFIED TYPE: ICD-10-CM

## 2023-05-26 DIAGNOSIS — Z00.00 ROUTINE MEDICAL EXAM: Primary | ICD-10-CM

## 2023-05-26 DIAGNOSIS — J43.9 PULMONARY EMPHYSEMA, UNSPECIFIED EMPHYSEMA TYPE: ICD-10-CM

## 2023-05-26 DIAGNOSIS — F33.0 MAJOR DEPRESSIVE DISORDER, RECURRENT, MILD: ICD-10-CM

## 2023-05-26 DIAGNOSIS — R73.01 IFG (IMPAIRED FASTING GLUCOSE): ICD-10-CM

## 2023-05-26 DIAGNOSIS — H69.93 ETD (EUSTACHIAN TUBE DYSFUNCTION), BILATERAL: ICD-10-CM

## 2023-05-26 DIAGNOSIS — I10 ESSENTIAL HYPERTENSION: ICD-10-CM

## 2023-05-26 LAB
ALBUMIN SERPL BCP-MCNC: 3.7 G/DL (ref 3.5–5.2)
ALP SERPL-CCNC: 55 U/L (ref 55–135)
ALT SERPL W/O P-5'-P-CCNC: 32 U/L (ref 10–44)
ANION GAP SERPL CALC-SCNC: 8 MMOL/L (ref 8–16)
AST SERPL-CCNC: 34 U/L (ref 10–40)
BILIRUB SERPL-MCNC: 0.4 MG/DL (ref 0.1–1)
BUN SERPL-MCNC: 16 MG/DL (ref 8–23)
CALCIUM SERPL-MCNC: 9.8 MG/DL (ref 8.7–10.5)
CHLORIDE SERPL-SCNC: 106 MMOL/L (ref 95–110)
CHOLEST SERPL-MCNC: 191 MG/DL (ref 120–199)
CHOLEST/HDLC SERPL: 6 {RATIO} (ref 2–5)
CO2 SERPL-SCNC: 26 MMOL/L (ref 23–29)
CREAT SERPL-MCNC: 0.9 MG/DL (ref 0.5–1.4)
EST. GFR  (NO RACE VARIABLE): >60 ML/MIN/1.73 M^2
ESTIMATED AVG GLUCOSE: 123 MG/DL (ref 68–131)
GLUCOSE SERPL-MCNC: 106 MG/DL (ref 70–110)
HBA1C MFR BLD: 5.9 % (ref 4–5.6)
HDLC SERPL-MCNC: 32 MG/DL (ref 40–75)
HDLC SERPL: 16.8 % (ref 20–50)
LDLC SERPL CALC-MCNC: 125.2 MG/DL (ref 63–159)
NONHDLC SERPL-MCNC: 159 MG/DL
POTASSIUM SERPL-SCNC: 4.5 MMOL/L (ref 3.5–5.1)
PROT SERPL-MCNC: 7.3 G/DL (ref 6–8.4)
SODIUM SERPL-SCNC: 140 MMOL/L (ref 136–145)
TRIGL SERPL-MCNC: 169 MG/DL (ref 30–150)
TSH SERPL DL<=0.005 MIU/L-ACNC: 2.6 UIU/ML (ref 0.4–4)

## 2023-05-26 PROCEDURE — 36415 COLL VENOUS BLD VENIPUNCTURE: CPT | Mod: PO | Performed by: FAMILY MEDICINE

## 2023-05-26 PROCEDURE — 3288F PR FALLS RISK ASSESSMENT DOCUMENTED: ICD-10-PCS | Mod: CPTII,S$GLB,, | Performed by: FAMILY MEDICINE

## 2023-05-26 PROCEDURE — 99999 PR PBB SHADOW E&M-EST. PATIENT-LVL IV: ICD-10-PCS | Mod: PBBFAC,,, | Performed by: FAMILY MEDICINE

## 2023-05-26 PROCEDURE — 1159F PR MEDICATION LIST DOCUMENTED IN MEDICAL RECORD: ICD-10-PCS | Mod: CPTII,S$GLB,, | Performed by: FAMILY MEDICINE

## 2023-05-26 PROCEDURE — 83036 HEMOGLOBIN GLYCOSYLATED A1C: CPT | Performed by: FAMILY MEDICINE

## 2023-05-26 PROCEDURE — 3079F DIAST BP 80-89 MM HG: CPT | Mod: CPTII,S$GLB,, | Performed by: FAMILY MEDICINE

## 2023-05-26 PROCEDURE — 1126F AMNT PAIN NOTED NONE PRSNT: CPT | Mod: CPTII,S$GLB,, | Performed by: FAMILY MEDICINE

## 2023-05-26 PROCEDURE — 99397 PR PREVENTIVE VISIT,EST,65 & OVER: ICD-10-PCS | Mod: GZ,S$GLB,, | Performed by: FAMILY MEDICINE

## 2023-05-26 PROCEDURE — 3074F SYST BP LT 130 MM HG: CPT | Mod: CPTII,S$GLB,, | Performed by: FAMILY MEDICINE

## 2023-05-26 PROCEDURE — 99499 RISK ADDL DX/OHS AUDIT: ICD-10-PCS | Mod: S$GLB,,, | Performed by: FAMILY MEDICINE

## 2023-05-26 PROCEDURE — 1101F PR PT FALLS ASSESS DOC 0-1 FALLS W/OUT INJ PAST YR: ICD-10-PCS | Mod: CPTII,S$GLB,, | Performed by: FAMILY MEDICINE

## 2023-05-26 PROCEDURE — 1101F PT FALLS ASSESS-DOCD LE1/YR: CPT | Mod: CPTII,S$GLB,, | Performed by: FAMILY MEDICINE

## 2023-05-26 PROCEDURE — 99999 PR PBB SHADOW E&M-EST. PATIENT-LVL IV: CPT | Mod: PBBFAC,,, | Performed by: FAMILY MEDICINE

## 2023-05-26 PROCEDURE — 80053 COMPREHEN METABOLIC PANEL: CPT | Performed by: FAMILY MEDICINE

## 2023-05-26 PROCEDURE — 99397 PER PM REEVAL EST PAT 65+ YR: CPT | Mod: GZ,S$GLB,, | Performed by: FAMILY MEDICINE

## 2023-05-26 PROCEDURE — 3079F PR MOST RECENT DIASTOLIC BLOOD PRESSURE 80-89 MM HG: ICD-10-PCS | Mod: CPTII,S$GLB,, | Performed by: FAMILY MEDICINE

## 2023-05-26 PROCEDURE — 3288F FALL RISK ASSESSMENT DOCD: CPT | Mod: CPTII,S$GLB,, | Performed by: FAMILY MEDICINE

## 2023-05-26 PROCEDURE — 99499 UNLISTED E&M SERVICE: CPT | Mod: S$GLB,,, | Performed by: FAMILY MEDICINE

## 2023-05-26 PROCEDURE — 3074F PR MOST RECENT SYSTOLIC BLOOD PRESSURE < 130 MM HG: ICD-10-PCS | Mod: CPTII,S$GLB,, | Performed by: FAMILY MEDICINE

## 2023-05-26 PROCEDURE — 1126F PR PAIN SEVERITY QUANTIFIED, NO PAIN PRESENT: ICD-10-PCS | Mod: CPTII,S$GLB,, | Performed by: FAMILY MEDICINE

## 2023-05-26 PROCEDURE — 80061 LIPID PANEL: CPT | Performed by: FAMILY MEDICINE

## 2023-05-26 PROCEDURE — 84443 ASSAY THYROID STIM HORMONE: CPT | Performed by: FAMILY MEDICINE

## 2023-05-26 PROCEDURE — 1159F MED LIST DOCD IN RCRD: CPT | Mod: CPTII,S$GLB,, | Performed by: FAMILY MEDICINE

## 2023-05-26 RX ORDER — ESCITALOPRAM OXALATE 20 MG/1
20 TABLET ORAL DAILY
Qty: 90 TABLET | Refills: 3 | Status: SHIPPED | OUTPATIENT
Start: 2023-05-26

## 2023-05-26 RX ORDER — PREDNISONE 20 MG/1
TABLET ORAL
Qty: 12 TABLET | Refills: 0 | Status: SHIPPED | OUTPATIENT
Start: 2023-05-26 | End: 2023-11-07

## 2023-05-26 NOTE — PROGRESS NOTES
Subjective:       Patient ID: Odette Hewitt is a 77 y.o. female.    Chief Complaint: Annual Exam    HPI    Here for a check up    htn stable.      Hypothyroidism stable.      hld stable.      Anxiety stable while on lexapro.      Non thrombocytopenic purpura monitored and stable.     Copd monitored and stable.       Review of Systems      Review of Systems   Constitutional: Negative for fever and chills.   HENT: Negative for hearing loss and neck stiffness.    Eyes: Negative for redness and itching.   Respiratory: Negative for cough and choking.    Cardiovascular: Negative for chest pain and leg swelling.  Abdomen: Negative for abdominal pain and blood in stool.   Genitourinary: Negative for dysuria and flank pain.   Musculoskeletal: Negative for back pain and gait problem.   Neurological: Negative for light-headedness and headaches.   Hematological: Negative for adenopathy.   Psychiatric/Behavioral: Negative for behavioral problems.     Objective:      Physical Exam  Constitutional:       General: She is not in acute distress.     Appearance: She is well-developed.   HENT:      Head: Normocephalic.   Eyes:      Pupils: Pupils are equal, round, and reactive to light.   Neck:      Thyroid: No thyromegaly.   Cardiovascular:      Rate and Rhythm: Normal rate and regular rhythm.      Heart sounds: Normal heart sounds. No murmur heard.    No friction rub.   Pulmonary:      Breath sounds: Normal breath sounds. No wheezing or rales.   Abdominal:      General: Bowel sounds are normal. There is no distension.      Palpations: Abdomen is soft. There is no mass.      Tenderness: There is no abdominal tenderness.   Musculoskeletal:         General: No tenderness. Normal range of motion.      Cervical back: Normal range of motion and neck supple.   Skin:     General: Skin is warm.      Findings: No erythema or rash.   Neurological:      Mental Status: She is alert and oriented to person, place, and time.      Cranial Nerves:  No cranial nerve deficit.      Deep Tendon Reflexes: Reflexes are normal and symmetric.   Psychiatric:         Thought Content: Thought content normal.       Assessment:       1. Routine medical exam    2. Essential hypertension    3. Hypothyroidism, unspecified type    4. IFG (impaired fasting glucose)    5. Other nonthrombocytopenic purpura    6. Pulmonary emphysema, unspecified emphysema type    7. Major depressive disorder, recurrent, mild        Plan:       Routine medical exam    Essential hypertension  -     Comprehensive Metabolic Panel; Future  -     Lipid Panel; Future    Hypothyroidism, unspecified type  -     TSH; Future    IFG (impaired fasting glucose)  -     Hemoglobin A1C; Future    Other nonthrombocytopenic purpura    Pulmonary emphysema, unspecified emphysema type    Major depressive disorder, recurrent, mild    Other orders  -     EScitalopram oxalate (LEXAPRO) 20 MG tablet; Take 1 tablet (20 mg total) by mouth once daily. Due for annual with PCP  Dispense: 90 tablet; Refill: 3                Plan:  Labs today  Cont current meds  F/u in 1 year      Medication List with Changes/Refills   Current Medications    ACYCLOVIR 5% (ZOVIRAX) 5 % OINTMENT    Apply topically 5 (five) times daily.    ATENOLOL (TENORMIN) 25 MG TABLET    TAKE 1 TABLET BY MOUTH EVERY DAY    AZELASTINE (ASTELIN) 137 MCG (0.1 %) NASAL SPRAY    1 spray (137 mcg total) by Nasal route 2 (two) times daily.    CALCIPOTRIENE-BETAMETHASONE (TACLONEX) OINTMENT    Apply topically once daily.    FENOFIBRATE 160 MG TAB    TAKE 1 TABLET BY MOUTH ONCE DAILY.    FISH OIL-OMEGA-3 FATTY ACIDS 300-1,000 MG CAPSULE    Take 2 g by mouth once daily.    FLUTICASONE PROPIONATE (FLONASE) 50 MCG/ACTUATION NASAL SPRAY    1 spray (50 mcg total) by Each Nostril route 2 (two) times a day.    FLUZONE HIGHDOSE QUAD 22-23  MCG/0.7 ML SYRG        LEVOCETIRIZINE (XYZAL) 5 MG TABLET    Take 1 tablet (5 mg total) by mouth every evening.    LEVOTHYROXINE  (SYNTHROID) 75 MCG TABLET    TAKE 1 TABLET BY MOUTH EVERY DAY    NORTRIPTYLINE (PAMELOR) 25 MG CAPSULE    TAKE 1 CAPSULE BY MOUTH EVERY DAY IN THE EVENING    PAZEO 0.7 % DROP    INSTILL 1 DROP INTO BOTH EYES ONCE A DAY FOR ITCHY EYES   Changed and/or Refilled Medications    Modified Medication Previous Medication    ESCITALOPRAM OXALATE (LEXAPRO) 20 MG TABLET EScitalopram oxalate (LEXAPRO) 20 MG tablet       Take 1 tablet (20 mg total) by mouth once daily. Due for annual with PCP    TAKE 1 TABLET (20 MG TOTAL) BY MOUTH ONCE DAILY. DUE FOR ANNUAL WITH PCP   Discontinued Medications    HYDROCORTISONE 2.5 % CREAM    Apply topically 2 (two) times daily. To small area, thin coat only, smallest area needed

## 2023-06-22 RX ORDER — NORTRIPTYLINE HYDROCHLORIDE 25 MG/1
CAPSULE ORAL
Qty: 90 CAPSULE | Refills: 3 | Status: SHIPPED | OUTPATIENT
Start: 2023-06-22

## 2023-06-22 NOTE — TELEPHONE ENCOUNTER
Provider Staff:  Action required for this patient     Please see care gap opportunities below in Care Due Message.    Thanks!  [facility]     Appointments      Date Provider   Last Visit   5/26/2023 Juancho Benítze MD   Next Visit   Visit date not found Juancho Benítez MD     Refill Decision Note   Odette Hewitt  is requesting a refill authorization.  Brief Assessment and Rationale for Refill:  Approve     Medication Therapy Plan:         Comments:     Note composed:11:05 AM 06/22/2023             Appointments     Last Visit   5/26/2023 Juancho Benítez MD   Next Visit   Visit date not found Juancho Benítez MD

## 2023-06-22 NOTE — TELEPHONE ENCOUNTER
Care Due:                  Date            Visit Type   Department     Provider  --------------------------------------------------------------------------------                                EP -                              [facility] FAMILY  Last Visit: 05-      CARE ([facility])   MEDICINE       Juancho VILLANUEVA                               -                              [facility]  Next Visit: 05-      CARE ([facility])   MEDICINE       Roberth Edwards                                                            Last  Test          Frequency    Reason                     Performed    Due Date  --------------------------------------------------------------------------------    CBC.........  12 months..  fenofibrate..............  Not Found    Overdue    [employer] Embedded Care Due Messages. Reference number: [id number].   6/22/2023 10:26:16 AM CDT

## 2023-08-09 ENCOUNTER — PATIENT MESSAGE (OUTPATIENT)
Dept: ADMINISTRATIVE | Facility: HOSPITAL | Age: 78
End: 2023-08-09
Payer: MEDICARE

## 2023-11-07 ENCOUNTER — OFFICE VISIT (OUTPATIENT)
Dept: FAMILY MEDICINE | Facility: CLINIC | Age: 78
End: 2023-11-07
Payer: MEDICARE

## 2023-11-07 VITALS
HEART RATE: 100 BPM | OXYGEN SATURATION: 97 % | BODY MASS INDEX: 28.24 KG/M2 | DIASTOLIC BLOOD PRESSURE: 58 MMHG | HEIGHT: 64 IN | SYSTOLIC BLOOD PRESSURE: 124 MMHG | WEIGHT: 165.38 LBS

## 2023-11-07 DIAGNOSIS — F41.9 ANXIETY: ICD-10-CM

## 2023-11-07 DIAGNOSIS — N30.00 ACUTE CYSTITIS WITHOUT HEMATURIA: ICD-10-CM

## 2023-11-07 DIAGNOSIS — I10 ESSENTIAL HYPERTENSION: ICD-10-CM

## 2023-11-07 DIAGNOSIS — R30.0 DYSURIA: Primary | ICD-10-CM

## 2023-11-07 DIAGNOSIS — F33.0 MAJOR DEPRESSIVE DISORDER, RECURRENT, MILD: ICD-10-CM

## 2023-11-07 DIAGNOSIS — J43.9 PULMONARY EMPHYSEMA, UNSPECIFIED EMPHYSEMA TYPE: ICD-10-CM

## 2023-11-07 DIAGNOSIS — E03.9 HYPOTHYROIDISM, UNSPECIFIED TYPE: ICD-10-CM

## 2023-11-07 LAB
BACTERIA #/AREA URNS HPF: ABNORMAL /HPF
BILIRUB UR QL STRIP: NEGATIVE
CLARITY UR: CLEAR
COLOR UR: YELLOW
GLUCOSE UR QL STRIP: NEGATIVE
HGB UR QL STRIP: ABNORMAL
KETONES UR QL STRIP: NEGATIVE
LEUKOCYTE ESTERASE UR QL STRIP: ABNORMAL
MICROSCOPIC COMMENT: ABNORMAL
NITRITE UR QL STRIP: NEGATIVE
PH UR STRIP: 6 [PH] (ref 5–8)
PROT UR QL STRIP: NEGATIVE
RBC #/AREA URNS HPF: 1 /HPF (ref 0–4)
SP GR UR STRIP: <=1.005 (ref 1–1.03)
SQUAMOUS #/AREA URNS HPF: 1 /HPF
URN SPEC COLLECT METH UR: ABNORMAL
WBC #/AREA URNS HPF: 8 /HPF (ref 0–5)

## 2023-11-07 PROCEDURE — 1101F PT FALLS ASSESS-DOCD LE1/YR: CPT | Mod: CPTII,S$GLB,, | Performed by: PHYSICIAN ASSISTANT

## 2023-11-07 PROCEDURE — 87077 CULTURE AEROBIC IDENTIFY: CPT | Performed by: PHYSICIAN ASSISTANT

## 2023-11-07 PROCEDURE — 81000 URINALYSIS NONAUTO W/SCOPE: CPT | Mod: PO | Performed by: PHYSICIAN ASSISTANT

## 2023-11-07 PROCEDURE — 1159F MED LIST DOCD IN RCRD: CPT | Mod: CPTII,S$GLB,, | Performed by: PHYSICIAN ASSISTANT

## 2023-11-07 PROCEDURE — 3078F PR MOST RECENT DIASTOLIC BLOOD PRESSURE < 80 MM HG: ICD-10-PCS | Mod: CPTII,S$GLB,, | Performed by: PHYSICIAN ASSISTANT

## 2023-11-07 PROCEDURE — 3078F DIAST BP <80 MM HG: CPT | Mod: CPTII,S$GLB,, | Performed by: PHYSICIAN ASSISTANT

## 2023-11-07 PROCEDURE — 87086 URINE CULTURE/COLONY COUNT: CPT | Performed by: PHYSICIAN ASSISTANT

## 2023-11-07 PROCEDURE — 87186 SC STD MICRODIL/AGAR DIL: CPT | Performed by: PHYSICIAN ASSISTANT

## 2023-11-07 PROCEDURE — 3074F PR MOST RECENT SYSTOLIC BLOOD PRESSURE < 130 MM HG: ICD-10-PCS | Mod: CPTII,S$GLB,, | Performed by: PHYSICIAN ASSISTANT

## 2023-11-07 PROCEDURE — 3074F SYST BP LT 130 MM HG: CPT | Mod: CPTII,S$GLB,, | Performed by: PHYSICIAN ASSISTANT

## 2023-11-07 PROCEDURE — 1125F AMNT PAIN NOTED PAIN PRSNT: CPT | Mod: CPTII,S$GLB,, | Performed by: PHYSICIAN ASSISTANT

## 2023-11-07 PROCEDURE — 3288F PR FALLS RISK ASSESSMENT DOCUMENTED: ICD-10-PCS | Mod: CPTII,S$GLB,, | Performed by: PHYSICIAN ASSISTANT

## 2023-11-07 PROCEDURE — 99999 PR PBB SHADOW E&M-EST. PATIENT-LVL III: CPT | Mod: PBBFAC,,, | Performed by: PHYSICIAN ASSISTANT

## 2023-11-07 PROCEDURE — 99214 PR OFFICE/OUTPT VISIT, EST, LEVL IV, 30-39 MIN: ICD-10-PCS | Mod: S$GLB,,, | Performed by: PHYSICIAN ASSISTANT

## 2023-11-07 PROCEDURE — 3288F FALL RISK ASSESSMENT DOCD: CPT | Mod: CPTII,S$GLB,, | Performed by: PHYSICIAN ASSISTANT

## 2023-11-07 PROCEDURE — 99214 OFFICE O/P EST MOD 30 MIN: CPT | Mod: S$GLB,,, | Performed by: PHYSICIAN ASSISTANT

## 2023-11-07 PROCEDURE — 99999 PR PBB SHADOW E&M-EST. PATIENT-LVL III: ICD-10-PCS | Mod: PBBFAC,,, | Performed by: PHYSICIAN ASSISTANT

## 2023-11-07 PROCEDURE — 1159F PR MEDICATION LIST DOCUMENTED IN MEDICAL RECORD: ICD-10-PCS | Mod: CPTII,S$GLB,, | Performed by: PHYSICIAN ASSISTANT

## 2023-11-07 PROCEDURE — 1101F PR PT FALLS ASSESS DOC 0-1 FALLS W/OUT INJ PAST YR: ICD-10-PCS | Mod: CPTII,S$GLB,, | Performed by: PHYSICIAN ASSISTANT

## 2023-11-07 PROCEDURE — 87088 URINE BACTERIA CULTURE: CPT | Performed by: PHYSICIAN ASSISTANT

## 2023-11-07 PROCEDURE — 1125F PR PAIN SEVERITY QUANTIFIED, PAIN PRESENT: ICD-10-PCS | Mod: CPTII,S$GLB,, | Performed by: PHYSICIAN ASSISTANT

## 2023-11-07 RX ORDER — SULFAMETHOXAZOLE AND TRIMETHOPRIM 800; 160 MG/1; MG/1
1 TABLET ORAL 2 TIMES DAILY
Qty: 6 TABLET | Refills: 0 | Status: SHIPPED | OUTPATIENT
Start: 2023-11-07 | End: 2023-11-10

## 2023-11-07 NOTE — PATIENT INSTRUCTIONS
A few reminders from today:    Drink plenty of water  Urinalysis today  Tylenol or advil as needed     Follow up with me if needed.   Please go to ER/urgent care if after hours or symptoms persist/worsen.     Do not hesitate to get in touch with me should you have any further questions.     Thank you for trusting me with your care!  I wish you health and happiness.    -Marilia Caban PA-C

## 2023-11-07 NOTE — PROGRESS NOTES
Subjective     Patient ID: Odette Hewitt is a 78 y.o. female.    Chief Complaint: Cystitis (Started Saturday, pt taking AZO)      HPI      Pt is new to me, PCP not established. Was formerly seeing Dr. Benítez.    Pt is a 78 year old female with anxiety, depression, emphysema, HLD, HTN, hypothyroidism. Pt is a Jehovahs witness. She presents today complaining of dysuria, urinary frequency. No hematuria, fever, chills, flank pain, N/V. Has been taking azo for symptoms.       Review of Systems   All other systems reviewed and are negative.         Objective     Physical Exam  Constitutional:       General: She is not in acute distress.     Appearance: Normal appearance. She is not ill-appearing, toxic-appearing or diaphoretic.   HENT:      Head: Normocephalic and atraumatic.   Cardiovascular:      Heart sounds: Normal heart sounds.   Pulmonary:      Effort: No respiratory distress.      Breath sounds: Normal breath sounds.   Abdominal:      General: There is no distension.      Palpations: There is no mass.      Tenderness: There is no abdominal tenderness. There is no right CVA tenderness, left CVA tenderness, guarding or rebound.      Hernia: No hernia is present.   Neurological:      General: No focal deficit present.      Mental Status: She is alert and oriented to person, place, and time.   Psychiatric:         Mood and Affect: Mood normal.         Behavior: Behavior normal.         Thought Content: Thought content normal.         Judgment: Judgment normal.       1. Dysuria  -increase hydration  - Urinalysis  - CULTURE, URINE    2. Pulmonary emphysema, unspecified emphysema type  -stable    3. Essential hypertension  -stable, continue current meds    4. Hypothyroidism, unspecified type  -stable, continue synthroid    5. Major depressive disorder, recurrent, mild  -stable, continue lexapro    6. Anxiety  -stable, continue lexapro      RTC/ER precautions given. F/U if symptoms persist or worsen    Marilia Caban,  ANTONELLA

## 2023-11-09 LAB — BACTERIA UR CULT: ABNORMAL

## 2023-12-06 DIAGNOSIS — E03.9 HYPOTHYROIDISM, UNSPECIFIED TYPE: ICD-10-CM

## 2023-12-06 RX ORDER — LEVOTHYROXINE SODIUM 75 UG/1
75 TABLET ORAL DAILY
Qty: 90 TABLET | Refills: 0 | Status: SHIPPED | OUTPATIENT
Start: 2023-12-06

## 2023-12-06 NOTE — TELEPHONE ENCOUNTER
----- Message from Madie Johnson sent at 12/6/2023  2:00 PM CST -----  Contact: self  Type:  RX Refill Request    Who Called:  pt  Refill or New Rx:  refill  RX Name and Strength:  levothyroxine (SYNTHROID) 75 MCG tablet  Medication  Date: 7/2/2023 Department: [facility] Ordering/Authorizing: Juancho Benítez MD         How is the patient currently taking it? (ex. 1XDay):  as directed  Is this a 30 day or 90 day RX:  90  Preferred Pharmacy with phone number:    [id number] [place] [address] [address]  Phone: [phone] Fax: [phone]     Local or Mail Order:  local  Ordering Provider:  domenica eNwell Call Back Number:  [phone]   Additional Information:  please advise

## 2024-01-29 RX ORDER — LEVOCETIRIZINE DIHYDROCHLORIDE 5 MG/1
5 TABLET, FILM COATED ORAL NIGHTLY
Qty: 90 TABLET | Refills: 3 | OUTPATIENT
Start: 2024-01-29

## 2024-01-29 RX ORDER — LEVOCETIRIZINE DIHYDROCHLORIDE 5 MG/1
5 TABLET, FILM COATED ORAL NIGHTLY
Qty: 90 TABLET | Refills: 3 | Status: SHIPPED | OUTPATIENT
Start: 2024-01-29 | End: 2025-01-28

## 2024-01-29 NOTE — TELEPHONE ENCOUNTER
----- Message from Patricia German sent at 1/29/2024  4:39 PM CST -----  Contact: pt  Type:  RX Refill Request    Who Called:the patient  Refill or New Rx:refil  RX Name and Strength: levocetirizine (XYZAL) 5 MG tablet           How is the patient currently taking it? (ex. 1XDay):as rx'd  Is this a 30 day or 90 day RX:90  Preferred Pharmacy with phone number:  [id number] [place] [address] [address]  Phone: [phone] Fax: [phone]      Local or Mail Order:local  Ordering Provider:n/a  Would the patient rather a call back or a response via [facility]? Call/  Best Call Back Number:[phone]   Additional Information: Thanks

## 2024-01-29 NOTE — TELEPHONE ENCOUNTER
----- Message from Patricia German sent at 1/29/2024  4:42 PM CST -----  Type:  RX Refill Request    Who Called:the patient  Refill or New Rx:refil  RX Name and Strength: levocetirizine (XYZAL) 5 MG tablet           How is the patient currently taking it? (ex. 1XDay):as rx'd  Is this a 30 day or 90 day RX:90  Preferred Pharmacy with phone number:  [facility] [id number] [place] [address] [address]  Phone: [phone] Fax: [phone]      Local or Mail Order:local  Ordering Provider:n/a  Would the patient rather a call back or a response via [facility]? Call/  Best Call Back Number:[phone]   Additional Information: Thanks

## 2024-01-30 RX ORDER — LEVOCETIRIZINE DIHYDROCHLORIDE 5 MG/1
5 TABLET, FILM COATED ORAL NIGHTLY
Qty: 30 TABLET | Refills: 11 | Status: SHIPPED | OUTPATIENT
Start: 2024-01-30 | End: 2025-01-29

## 2024-04-23 DIAGNOSIS — E03.9 HYPOTHYROIDISM, UNSPECIFIED TYPE: ICD-10-CM

## 2024-04-23 RX ORDER — FENOFIBRATE 160 MG/1
160 TABLET ORAL DAILY
Qty: 90 TABLET | Refills: 0 | Status: SHIPPED | OUTPATIENT
Start: 2024-04-23

## 2024-04-23 RX ORDER — LEVOTHYROXINE SODIUM 75 UG/1
75 TABLET ORAL
Qty: 90 TABLET | Refills: 0 | Status: SHIPPED | OUTPATIENT
Start: 2024-04-23 | End: 2024-05-27 | Stop reason: SDUPTHER

## 2024-04-23 NOTE — TELEPHONE ENCOUNTER
----- Message from Pedro Young sent at 4/23/2024 10:51 AM CDT -----  Contact: pt  Type: Needs Medical Advice  Who Called:  pt  Best Call Back Number: [phone]    Additional Information: Pt is calling the office has never been seen but trying to get fenofibrate 160 MG Tab refilled was a pt of .Please call back and advise.      [facility] [id number] [place] [facility] - [address] [address]  Phone: [phone] Fax: [phone]

## 2024-04-24 RX ORDER — ATENOLOL 25 MG/1
TABLET ORAL
Qty: 90 TABLET | Refills: 3 | Status: SHIPPED | OUTPATIENT
Start: 2024-04-24 | End: 2024-05-27

## 2024-04-24 NOTE — TELEPHONE ENCOUNTER
Refill Routing Note   Medication(s) are not appropriate for processing by [facility] for the following reason(s):        Responsible provider unclear  ED/Hospital Visit since last OV with provider    [facility] action(s):  Defer               Appointments  past 12m or future 3m with PCP    Date Provider   Last Visit   3/7/2022 Janak Medina MD   Next Visit   Visit date not found Janak Medina MD   ED visits in past 90 days: 0        Note composed:8:54 PM 04/23/2024

## 2024-05-27 ENCOUNTER — OFFICE VISIT (OUTPATIENT)
Dept: FAMILY MEDICINE | Facility: CLINIC | Age: 79
End: 2024-05-27
Payer: MEDICARE

## 2024-05-27 VITALS
WEIGHT: 161.25 LBS | HEIGHT: 64 IN | DIASTOLIC BLOOD PRESSURE: 64 MMHG | BODY MASS INDEX: 27.53 KG/M2 | SYSTOLIC BLOOD PRESSURE: 106 MMHG | HEART RATE: 69 BPM | OXYGEN SATURATION: 95 %

## 2024-05-27 DIAGNOSIS — I10 ESSENTIAL HYPERTENSION: Primary | ICD-10-CM

## 2024-05-27 DIAGNOSIS — M70.52 PES ANSERINUS BURSITIS OF LEFT KNEE: ICD-10-CM

## 2024-05-27 DIAGNOSIS — R73.9 HYPERGLYCEMIA: ICD-10-CM

## 2024-05-27 DIAGNOSIS — M79.10 MYALGIA DUE TO STATIN: ICD-10-CM

## 2024-05-27 DIAGNOSIS — J43.9 PULMONARY EMPHYSEMA, UNSPECIFIED EMPHYSEMA TYPE: ICD-10-CM

## 2024-05-27 DIAGNOSIS — F33.0 MAJOR DEPRESSIVE DISORDER, RECURRENT, MILD: ICD-10-CM

## 2024-05-27 DIAGNOSIS — Z78.0 ASYMPTOMATIC MENOPAUSAL STATE: ICD-10-CM

## 2024-05-27 DIAGNOSIS — F41.9 ANXIETY: ICD-10-CM

## 2024-05-27 DIAGNOSIS — E78.5 HYPERLIPIDEMIA, UNSPECIFIED HYPERLIPIDEMIA TYPE: ICD-10-CM

## 2024-05-27 DIAGNOSIS — M85.88 OSTEOPENIA OF LUMBAR SPINE: ICD-10-CM

## 2024-05-27 DIAGNOSIS — E03.9 HYPOTHYROIDISM, UNSPECIFIED TYPE: ICD-10-CM

## 2024-05-27 DIAGNOSIS — R61 DIAPHORESIS: ICD-10-CM

## 2024-05-27 DIAGNOSIS — T46.6X5A MYALGIA DUE TO STATIN: ICD-10-CM

## 2024-05-27 PROBLEM — D69.2 OTHER NONTHROMBOCYTOPENIC PURPURA: Status: RESOLVED | Noted: 2023-05-26 | Resolved: 2024-05-27

## 2024-05-27 PROCEDURE — 3288F FALL RISK ASSESSMENT DOCD: CPT | Mod: CPTII,S$GLB,, | Performed by: STUDENT IN AN ORGANIZED HEALTH CARE EDUCATION/TRAINING PROGRAM

## 2024-05-27 PROCEDURE — 99214 OFFICE O/P EST MOD 30 MIN: CPT | Mod: S$GLB,,, | Performed by: STUDENT IN AN ORGANIZED HEALTH CARE EDUCATION/TRAINING PROGRAM

## 2024-05-27 PROCEDURE — 1159F MED LIST DOCD IN RCRD: CPT | Mod: CPTII,S$GLB,, | Performed by: STUDENT IN AN ORGANIZED HEALTH CARE EDUCATION/TRAINING PROGRAM

## 2024-05-27 PROCEDURE — 3074F SYST BP LT 130 MM HG: CPT | Mod: CPTII,S$GLB,, | Performed by: STUDENT IN AN ORGANIZED HEALTH CARE EDUCATION/TRAINING PROGRAM

## 2024-05-27 PROCEDURE — 3078F DIAST BP <80 MM HG: CPT | Mod: CPTII,S$GLB,, | Performed by: STUDENT IN AN ORGANIZED HEALTH CARE EDUCATION/TRAINING PROGRAM

## 2024-05-27 PROCEDURE — 1101F PT FALLS ASSESS-DOCD LE1/YR: CPT | Mod: CPTII,S$GLB,, | Performed by: STUDENT IN AN ORGANIZED HEALTH CARE EDUCATION/TRAINING PROGRAM

## 2024-05-27 PROCEDURE — 1126F AMNT PAIN NOTED NONE PRSNT: CPT | Mod: CPTII,S$GLB,, | Performed by: STUDENT IN AN ORGANIZED HEALTH CARE EDUCATION/TRAINING PROGRAM

## 2024-05-27 PROCEDURE — 99999 PR PBB SHADOW E&M-EST. PATIENT-LVL IV: CPT | Mod: PBBFAC,,, | Performed by: STUDENT IN AN ORGANIZED HEALTH CARE EDUCATION/TRAINING PROGRAM

## 2024-05-27 RX ORDER — LEVOTHYROXINE SODIUM 75 UG/1
75 TABLET ORAL
Qty: 90 TABLET | Refills: 3 | Status: SHIPPED | OUTPATIENT
Start: 2024-05-27

## 2024-05-27 RX ORDER — NORTRIPTYLINE HYDROCHLORIDE 10 MG/1
10 CAPSULE ORAL DAILY
Qty: 28 CAPSULE | Refills: 0 | Status: SHIPPED | OUTPATIENT
Start: 2024-05-27 | End: 2024-06-24

## 2024-05-27 RX ORDER — ESCITALOPRAM OXALATE 20 MG/1
20 TABLET ORAL DAILY
Qty: 90 TABLET | Refills: 3 | Status: SHIPPED | OUTPATIENT
Start: 2024-05-27

## 2024-05-27 NOTE — PROGRESS NOTES
Name: Odette Hewitt  MRN: [id number]  : 1945    Subjective   HPI  Patient presents to establish care. She has several concerns today - need for blood pressure medication, need for cholesterol medication, excessive sweats, left knee pain    Review of Systems   Constitutional:  Positive for diaphoresis. Negative for unexpected weight change.   HENT:          Dry mouth   Respiratory:  Negative for shortness of breath.    Cardiovascular:  Negative for chest pain.   Musculoskeletal:  Positive for arthralgias (left knee pain with walking, getting in and out of car).   Neurological:  Negative for dizziness and light-headedness.        Patient Active Problem List   Diagnosis    Refusal of blood transfusions as patient is [employer]    Hypothyroid    Hyperlipidemia    Essential hypertension    Anxiety    Major depressive disorder, recurrent, mild    Pulmonary emphysema, unspecified emphysema type    Myalgia due to statin    Diaphoresis    Pes anserinus bursitis of left knee    Osteopenia of lumbar spine       Current Outpatient Medications on File Prior to Visit   Medication Sig Dispense Refill    acyclovir 5% (ZOVIRAX) 5 % ointment Apply topically 5 (five) times daily. 15 g 2    calcipotriene-betamethasone (TACLONEX) ointment Apply topically once daily. 100 g 2    fenofibrate 160 MG Tab Take 1 tablet (160 mg total) by mouth once daily. 90 tablet 0    fish oil-omega-3 fatty acids 300-1,000 mg capsule Take 2 g by mouth once daily.      fluticasone propionate (FLONASE) 50 mcg/actuation nasal spray 1 spray (50 mcg total) by Each Nostril route 2 (two) times a day. 16 g 11    levocetirizine (XYZAL) 5 MG tablet Take 1 tablet (5 mg total) by mouth every evening. 90 tablet 3    PAZEO 0.7 % Drop INSTILL 1 DROP INTO BOTH EYES ONCE A DAY FOR ITCHY EYES      [DISCONTINUED] atenoloL (TENORMIN) 25 MG tablet TAKE 1 TABLET BY MOUTH EVERY DAY 90 tablet 3    [DISCONTINUED] EScitalopram oxalate (LEXAPRO) 20 MG tablet Take 1  tablet (20 mg total) by mouth once daily. Due for annual with PCP 90 tablet 3    [DISCONTINUED] levothyroxine (SYNTHROID) 75 MCG tablet TAKE 1 TABLET BY MOUTH EVERY DAY 90 tablet 0    [DISCONTINUED] nortriptyline (PAMELOR) 25 MG capsule TAKE 1 CAPSULE BY MOUTH EVERY DAY IN THE EVENING 90 capsule 3    azelastine (ASTELIN) 137 mcg (0.1 %) nasal spray 1 spray (137 mcg total) by Nasal route 2 (two) times daily. 30 mL 11    levocetirizine (XYZAL) 5 MG tablet Take 1 tablet (5 mg total) by mouth every evening. 30 tablet 11     No current facility-administered medications on file prior to visit.       Past Medical History:   Diagnosis Date    Chronic arm pain     on pamelor    HTN (hypertension)     Hyperlipidemia     Hypothyroid 2006    Osteopenia     Refusal of blood transfusions as patient is [employer]        Past Surgical History:   Procedure Laterality Date    HYSTERECTOMY      TONSILLECTOMY      TYMPANOSTOMY TUBE PLACEMENT          Family History   Problem Relation Name Age of Onset    Heart attack Father      Colon cancer Mother         Social History     Socioeconomic History    Marital status:    Tobacco Use    Smoking status: Never    Smokeless tobacco: Never   Substance and Sexual Activity    Alcohol use: No    Drug use: No     Social Determinants of Health     Financial Resource Strain: Low Risk  (1/14/2022)    Overall Financial Resource Strain (CARDIA)     Difficulty of Paying Living Expenses: Not hard at all   Food Insecurity: No Food Insecurity (1/14/2022)    Hunger Vital Sign     Worried About Running Out of Food in the Last Year: Never true     Ran Out of Food in the Last Year: Never true   Transportation Needs: No Transportation Needs (1/14/2022)    PRAPARE - Transportation     Lack of Transportation (Medical): No     Lack of Transportation (Non-Medical): No   Physical Activity: Insufficiently Active (1/14/2022)    Exercise Vital Sign     Days of Exercise per Week: 5 days     Minutes of  Exercise per Session: 20 min   Stress: No Stress Concern Present (1/14/2022)    [employer] - Occupational Stress Questionnaire     Feeling of Stress : Only a little       Review of patient's allergies indicates:   Allergen Reactions    Dexamethasone      Other reaction(s): Syncope  Other reaction(s): Muscle pain  Other reaction(s): Joint pain    Erythromycin      Other reaction(s): chest pain  Rash (skin)^  Rash (skin)^      Penicillins      Rash (skin)^    Simvastatin Other (See Comments)     Leg cramps        Health Maintenance Due   Topic Date Due    RSV Vaccine (Age 60+ and Pregnant patients) (1 - 1-dose 60+ series) Never done    COVID-19 Vaccine (4 - 2023-24 season) 09/01/2023    DEXA Scan  03/02/2024    TETANUS VACCINE  05/01/2024    Lipid Panel  05/26/2024    Hemoglobin A1c (Prediabetes)  05/26/2024       Objective   Vitals:    05/27/24 0803   BP: 106/64   Pulse: 69        Physical Exam  Constitutional:       General: She is not in acute distress.     Appearance: Normal appearance. She is well-developed.   HENT:      Head: Normocephalic and atraumatic.      Right Ear: External ear normal.      Left Ear: External ear normal.   Eyes:      Conjunctiva/sclera: Conjunctivae normal.   Cardiovascular:      Rate and Rhythm: Normal rate and regular rhythm.      Heart sounds: No murmur heard.     No friction rub. No gallop.   Pulmonary:      Effort: Pulmonary effort is normal. No respiratory distress.      Breath sounds: No wheezing, rhonchi or rales.   Abdominal:      General: Abdomen is flat. There is no distension.   Musculoskeletal:         General: No swelling or deformity.      Right lower leg: No edema.      Left lower leg: No edema.      Comments: Left pes anserine tenderness   Skin:     General: Skin is warm and dry.      Coloration: Skin is not jaundiced.   Neurological:      Mental Status: She is alert and oriented to person, place, and time. Mental status is at baseline.    Psychiatric:         Attention and Perception: Attention and perception normal.         Mood and Affect: Mood normal.         Speech: Speech normal.         Behavior: Behavior normal. Behavior is cooperative.         Thought Content: Thought content normal.         Cognition and Memory: Cognition normal.         Judgment: Judgment normal.          Assessment & Plan   1. Essential hypertension  Assessment & Plan:  Patient's blood pressure is excellent today. She is only on low dose labetalol - will stop today and check again in a few weeks to confirm that her blood pressure is still controlled.     Orders:  -     Comprehensive Metabolic Panel; Future; Expected date: 05/27/2024    2. Hyperlipidemia, unspecified hyperlipidemia type  -     Lipid Panel; Future; Expected date: 05/27/2024    3. Myalgia due to statin    4. Hyperglycemia  -     Hemoglobin A1C; Future    5. Hypothyroidism, unspecified type  -     TSH; Future; Expected date: 05/27/2024  -     levothyroxine (SYNTHROID) 75 MCG tablet; Take 1 tablet (75 mcg total) by mouth before breakfast.  Dispense: 90 tablet; Refill: 3    6. Diaphoresis  Assessment & Plan:  Patient has excessive sweating. She currently takes both Lexapro and nortriptyline - is not entirely sure why she takes the latter but it may have had to do with managing pain in her left arm after she had a complicated injection in her left arm. Will taper nortriptyline.     Orders:  -     nortriptyline (PAMELOR) 10 MG capsule; Take 1 capsule (10 mg total) by mouth once daily. TAKE 1 CAPSULE BY MOUTH EVERY DAY IN THE EVENING  Dispense: 28 capsule; Refill: 0    7. Major depressive disorder, recurrent, mild  Assessment & Plan:  Stable while on Lexapro. Continue medication.    Orders:  -     EScitalopram oxalate (LEXAPRO) 20 MG tablet; Take 1 tablet (20 mg total) by mouth once daily. Due for annual with PCP  Dispense: 90 tablet; Refill: 3    8. Anxiety  Assessment & Plan:  See depression.    Orders:  -      EScitalopram oxalate (LEXAPRO) 20 MG tablet; Take 1 tablet (20 mg total) by mouth once daily. Due for annual with PCP  Dispense: 90 tablet; Refill: 3    9. Pes anserinus bursitis of left knee  Assessment & Plan:  Has been an issue for many years - over a decade, at least. Has tried physical therapy. Reviewed diagnosis. Recommending rest and compression.       10. Osteopenia of lumbar spine  -     DXA Bone Density Axial Skeleton 1 or more sites; Future; Expected date: 05/27/2024    11. Asymptomatic menopausal state  -     DXA Bone Density Axial Skeleton 1 or more sites; Future; Expected date: 05/27/2024    12. Pulmonary emphysema, unspecified emphysema type  Assessment & Plan:  Patient has dyspnea on exertion but she attributes this to normal aging. Told she had emphysema a long time ago but is not on any inhalers. I don't have any imaging to confirm. If she has emphysema, it is likely clinically insignificant.          Follow up in 6 months. Plan to discuss cholesterol medication at that time. I spent 30 minutes pre-charting, interviewing patient, performing exam, formulating and discussing plan, placing orders, and documenting.     Roberth Edwards MD  05/27/2024

## 2024-05-27 NOTE — ASSESSMENT & PLAN NOTE
Patient's blood pressure is excellent today. She is only on low dose labetalol - will stop today and check again in a few weeks to confirm that her blood pressure is still controlled.

## 2024-05-27 NOTE — ASSESSMENT & PLAN NOTE
Patient has dyspnea on exertion but she attributes this to normal aging. Told she had emphysema a long time ago but is not on any inhalers. I don't have any imaging to confirm. If she has emphysema, it is likely clinically insignificant.

## 2024-05-27 NOTE — ASSESSMENT & PLAN NOTE
Has been an issue for many years - over a decade, at least. Has tried physical therapy. Reviewed diagnosis. Recommending rest and compression.

## 2024-05-27 NOTE — ASSESSMENT & PLAN NOTE
Patient has excessive sweating. She currently takes both Lexapro and nortriptyline - is not entirely sure why she takes the latter but it may have had to do with managing pain in her left arm after she had a complicated injection in her left arm. Will taper nortriptyline.

## 2024-05-28 DIAGNOSIS — Z00.00 ENCOUNTER FOR MEDICARE ANNUAL WELLNESS EXAM: ICD-10-CM

## 2024-06-11 ENCOUNTER — HOSPITAL ENCOUNTER (OUTPATIENT)
Dept: RADIOLOGY | Facility: HOSPITAL | Age: 79
Discharge: HOME | End: 2024-06-11
Attending: STUDENT IN AN ORGANIZED HEALTH CARE EDUCATION/TRAINING PROGRAM
Payer: MEDICARE

## 2024-06-11 ENCOUNTER — CLINICAL SUPPORT (OUTPATIENT)
Dept: FAMILY MEDICINE | Facility: CLINIC | Age: 79
End: 2024-06-11
Payer: MEDICARE

## 2024-06-11 VITALS — DIASTOLIC BLOOD PRESSURE: 74 MMHG | HEART RATE: 86 BPM | SYSTOLIC BLOOD PRESSURE: 120 MMHG

## 2024-06-11 DIAGNOSIS — M85.88 OSTEOPENIA OF LUMBAR SPINE: ICD-10-CM

## 2024-06-11 DIAGNOSIS — Z78.0 ASYMPTOMATIC MENOPAUSAL STATE: ICD-10-CM

## 2024-06-11 DIAGNOSIS — Z01.30 BP CHECK: Primary | ICD-10-CM

## 2024-06-11 PROCEDURE — 77080 DXA BONE DENSITY AXIAL: CPT | Mod: TC,PO

## 2024-06-11 PROCEDURE — 99499 UNLISTED E&M SERVICE: CPT | Mod: S$GLB,,, | Performed by: STUDENT IN AN ORGANIZED HEALTH CARE EDUCATION/TRAINING PROGRAM

## 2024-06-11 PROCEDURE — 77080 DXA BONE DENSITY AXIAL: CPT | Mod: 26,,, | Performed by: RADIOLOGY

## 2024-06-11 NOTE — PROGRESS NOTES
Odette Hewitt 78 y.o. female is here today for Blood Pressure check.   History of HTN no.    Review of patient's allergies indicates:   Allergen Reactions    Dexamethasone      Other reaction(s): Syncope  Other reaction(s): Muscle pain  Other reaction(s): Joint pain    Erythromycin      Other reaction(s): chest pain  Rash (skin)^  Rash (skin)^      Penicillins      Rash (skin)^    Simvastatin Other (See Comments)     Leg cramps     Creatinine   Date Value Ref Range Status   06/11/2024 0.9 0.5 - 1.4 mg/dL Final     Sodium   Date Value Ref Range Status   06/11/2024 142 136 - 145 mmol/L Final     Potassium   Date Value Ref Range Status   06/11/2024 4.4 3.5 - 5.1 mmol/L Final   ]    Current Outpatient Medications:     acyclovir 5% (ZOVIRAX) 5 % ointment, Apply topically 5 (five) times daily., Disp: 15 g, Rfl: 2    azelastine (ASTELIN) 137 mcg (0.1 %) nasal spray, 1 spray (137 mcg total) by Nasal route 2 (two) times daily., Disp: 30 mL, Rfl: 11    calcipotriene-betamethasone (TACLONEX) ointment, Apply topically once daily., Disp: 100 g, Rfl: 2    EScitalopram oxalate (LEXAPRO) 20 MG tablet, Take 1 tablet (20 mg total) by mouth once daily. Due for annual with PCP, Disp: 90 tablet, Rfl: 3    fenofibrate 160 MG Tab, Take 1 tablet (160 mg total) by mouth once daily., Disp: 90 tablet, Rfl: 0    fish oil-omega-3 fatty acids 300-1,000 mg capsule, Take 2 g by mouth once daily., Disp: , Rfl:     fluticasone propionate (FLONASE) 50 mcg/actuation nasal spray, 1 spray (50 mcg total) by Each Nostril route 2 (two) times a day., Disp: 16 g, Rfl: 11    levocetirizine (XYZAL) 5 MG tablet, Take 1 tablet (5 mg total) by mouth every evening., Disp: 30 tablet, Rfl: 11    levocetirizine (XYZAL) 5 MG tablet, Take 1 tablet (5 mg total) by mouth every evening., Disp: 90 tablet, Rfl: 3    levothyroxine (SYNTHROID) 75 MCG tablet, Take 1 tablet (75 mcg total) by mouth before breakfast., Disp: 90 tablet, Rfl: 3    nortriptyline (PAMELOR) 10 MG  capsule, Take 1 capsule (10 mg total) by mouth once daily. TAKE 1 CAPSULE BY MOUTH EVERY DAY IN THE EVENING, Disp: 28 capsule, Rfl: 0    PAZEO 0.7 % Drop, INSTILL 1 DROP INTO BOTH EYES ONCE A DAY FOR ITCHY EYES, Disp: , Rfl:   Does patient have record of home blood pressure readings no. Readings have been averaging N/A.   Pt does not take BP medication.   Patient is asymptomatic.   Complains of N/A.    BP: 120/74 , Pulse: 86 .      Dr. Edwards notified.

## 2024-07-19 RX ORDER — FENOFIBRATE 160 MG/1
160 TABLET ORAL
Qty: 90 TABLET | Refills: 2 | Status: SHIPPED | OUTPATIENT
Start: 2024-07-19

## 2024-07-19 NOTE — TELEPHONE ENCOUNTER
Care Due:                  Date            Visit Type   Department     Provider  --------------------------------------------------------------------------------                                EP -                              [facility] FAMILY  Last Visit: 05-      CARE ([facility])   EMILY Edwards                              EP -                              PRIMARY      [facility] FAM[facility]  Next Visit: 12-      CARE ([facility])   EMILY Edwards                                                            Last  Test          Frequency    Reason                     Performed    Due Date  --------------------------------------------------------------------------------    CBC.........  12 months..  fenofibrate..............  Not Found    Overdue    [employer] Embedded Care Due Messages. Reference number: [id number].   7/19/2024 12:49:32 AM CDT

## 2024-07-19 NOTE — TELEPHONE ENCOUNTER
Refill Routing Note   Medication(s) are not appropriate for processing by [facility] for the following reason(s):        Required labs outdated    [facility] action(s):  Defer     Requires labs : Yes             Appointments  past 12m or future 3m with PCP    Date Provider   Last Visit   5/27/2024 Roberth Edwards MD   Next Visit   12/2/2024 Roberth Edwards MD   ED visits in past 90 days: 0        Note composed:6:50 AM 07/19/2024

## 2024-08-27 ENCOUNTER — PATIENT MESSAGE (OUTPATIENT)
Dept: FAMILY MEDICINE | Facility: CLINIC | Age: 79
End: 2024-08-27
Payer: MEDICARE

## 2024-12-02 ENCOUNTER — OFFICE VISIT (OUTPATIENT)
Dept: FAMILY MEDICINE | Facility: CLINIC | Age: 79
End: 2024-12-02
Payer: MEDICARE

## 2024-12-02 VITALS
SYSTOLIC BLOOD PRESSURE: 116 MMHG | OXYGEN SATURATION: 95 % | WEIGHT: 160.94 LBS | HEART RATE: 74 BPM | BODY MASS INDEX: 27.48 KG/M2 | HEIGHT: 64 IN | DIASTOLIC BLOOD PRESSURE: 70 MMHG

## 2024-12-02 DIAGNOSIS — I10 ESSENTIAL HYPERTENSION: ICD-10-CM

## 2024-12-02 DIAGNOSIS — E78.2 MIXED HYPERLIPIDEMIA: ICD-10-CM

## 2024-12-02 DIAGNOSIS — R73.03 PREDIABETES: Primary | ICD-10-CM

## 2024-12-02 DIAGNOSIS — H69.91 EUSTACHIAN TUBE DYSFUNCTION, RIGHT: ICD-10-CM

## 2024-12-02 DIAGNOSIS — E03.9 ACQUIRED HYPOTHYROIDISM: ICD-10-CM

## 2024-12-02 PROCEDURE — 1101F PT FALLS ASSESS-DOCD LE1/YR: CPT | Mod: CPTII,S$GLB,, | Performed by: STUDENT IN AN ORGANIZED HEALTH CARE EDUCATION/TRAINING PROGRAM

## 2024-12-02 PROCEDURE — 1159F MED LIST DOCD IN RCRD: CPT | Mod: CPTII,S$GLB,, | Performed by: STUDENT IN AN ORGANIZED HEALTH CARE EDUCATION/TRAINING PROGRAM

## 2024-12-02 PROCEDURE — 3288F FALL RISK ASSESSMENT DOCD: CPT | Mod: CPTII,S$GLB,, | Performed by: STUDENT IN AN ORGANIZED HEALTH CARE EDUCATION/TRAINING PROGRAM

## 2024-12-02 PROCEDURE — 99999 PR PBB SHADOW E&M-EST. PATIENT-LVL III: CPT | Mod: PBBFAC,,, | Performed by: STUDENT IN AN ORGANIZED HEALTH CARE EDUCATION/TRAINING PROGRAM

## 2024-12-02 PROCEDURE — 3078F DIAST BP <80 MM HG: CPT | Mod: CPTII,S$GLB,, | Performed by: STUDENT IN AN ORGANIZED HEALTH CARE EDUCATION/TRAINING PROGRAM

## 2024-12-02 PROCEDURE — 3074F SYST BP LT 130 MM HG: CPT | Mod: CPTII,S$GLB,, | Performed by: STUDENT IN AN ORGANIZED HEALTH CARE EDUCATION/TRAINING PROGRAM

## 2024-12-02 PROCEDURE — 99214 OFFICE O/P EST MOD 30 MIN: CPT | Mod: S$GLB,,, | Performed by: STUDENT IN AN ORGANIZED HEALTH CARE EDUCATION/TRAINING PROGRAM

## 2024-12-02 RX ORDER — MOMETASONE FUROATE MONOHYDRATE 50 UG/1
2 SPRAY, METERED NASAL DAILY
Qty: 17 G | Refills: 11 | Status: SHIPPED | OUTPATIENT
Start: 2024-12-02

## 2024-12-02 RX ORDER — SULFAMETHOXAZOLE AND TRIMETHOPRIM 800; 160 MG/1; MG/1
1 TABLET ORAL 2 TIMES DAILY
COMMUNITY
Start: 2024-06-26 | End: 2024-12-02

## 2024-12-02 NOTE — ASSESSMENT & PLAN NOTE
Blood pressure is excellent despite stopping labetalol. Currently not on any blood pressure medications. Continue to monitor.

## 2024-12-02 NOTE — ASSESSMENT & PLAN NOTE
Previously tried fluticasone nasal spray which didn't help. Switch to mometasone. Continue levocetirizine. If no improvement in 6 weeks, consider follow up with ENT.

## 2024-12-02 NOTE — PROGRESS NOTES
Name: Odette Hewitt  MRN: [id number]  : 1945    Subjective   HPI  Patient presents for regular follow up.     Review of Systems   HENT:  Positive for congestion and hearing loss (muffled hearing from time to time, as if she were underwater).         Patient Active Problem List   Diagnosis    Refusal of blood transfusions as patient is Moravian    Hypothyroid    Mixed hyperlipidemia    Essential hypertension    Anxiety    Major depressive disorder, recurrent, mild    Pulmonary emphysema, unspecified emphysema type    Myalgia due to statin    Diaphoresis    Pes anserinus bursitis of left knee    Osteopenia of lumbar spine    Eustachian tube dysfunction, right       Current Outpatient Medications on File Prior to Visit   Medication Sig Dispense Refill    acyclovir 5% (ZOVIRAX) 5 % ointment Apply topically 5 (five) times daily. 15 g 2    EScitalopram oxalate (LEXAPRO) 20 MG tablet Take 1 tablet (20 mg total) by mouth once daily. Due for annual with PCP 90 tablet 3    fenofibrate 160 MG Tab TAKE 1 TABLET BY MOUTH ONCE DAILY. 90 tablet 2    fish oil-omega-3 fatty acids 300-1,000 mg capsule Take 2 g by mouth once daily.      levocetirizine (XYZAL) 5 MG tablet Take 1 tablet (5 mg total) by mouth every evening. 30 tablet 11    levothyroxine (SYNTHROID) 75 MCG tablet Take 1 tablet (75 mcg total) by mouth before breakfast. 90 tablet 3    PAZEO 0.7 % Drop INSTILL 1 DROP INTO BOTH EYES ONCE A DAY FOR ITCHY EYES      [DISCONTINUED] calcipotriene-betamethasone (TACLONEX) ointment Apply topically once daily. 100 g 2    [DISCONTINUED] fluticasone propionate (FLONASE) 50 mcg/actuation nasal spray 1 spray (50 mcg total) by Each Nostril route 2 (two) times a day. 16 g 11    [DISCONTINUED] levocetirizine (XYZAL) 5 MG tablet Take 1 tablet (5 mg total) by mouth every evening. 90 tablet 3    azelastine (ASTELIN) 137 mcg (0.1 %) nasal spray 1 spray (137 mcg total) by Nasal route 2 (two) times daily. 30 mL 11     [DISCONTINUED] nortriptyline (PAMELOR) 10 MG capsule Take 1 capsule (10 mg total) by mouth once daily. TAKE 1 CAPSULE BY MOUTH EVERY DAY IN THE EVENING 28 capsule 0    [DISCONTINUED] sulfamethoxazole-trimethoprim 800-160mg (BACTRIM DS) 800-160 mg Tab Take 1 tablet by mouth 2 (two) times daily. (Patient not taking: Reported on 12/2/2024)       No current facility-administered medications on file prior to visit.       Past Medical History:   Diagnosis Date    Chronic arm pain     on pamelor    HTN (hypertension)     Hyperlipidemia     Hypothyroid 2006    Osteopenia     Refusal of blood transfusions as patient is [employer]ism        Past Surgical History:   Procedure Laterality Date    HYSTERECTOMY      TONSILLECTOMY      TYMPANOSTOMY TUBE PLACEMENT          Family History   Problem Relation Name Age of Onset    Heart attack Father      Colon cancer Mother         Social History     Socioeconomic History    Marital status:    Tobacco Use    Smoking status: Never    Smokeless tobacco: Never   Substance and Sexual Activity    Alcohol use: No    Drug use: No     Social Drivers of Health     Financial Resource Strain: Low Risk  (1/14/2022)    Overall Financial Resource Strain (CARDIA)     Difficulty of Paying Living Expenses: Not hard at all   Food Insecurity: No Food Insecurity (1/14/2022)    Hunger Vital Sign     Worried About Running Out of Food in the Last Year: Never true     Ran Out of Food in the Last Year: Never true   Transportation Needs: No Transportation Needs (1/14/2022)    PRAPARE - Transportation     Lack of Transportation (Medical): No     Lack of Transportation (Non-Medical): No   Physical Activity: Insufficiently Active (1/14/2022)    Exercise Vital Sign     Days of Exercise per Week: 5 days     Minutes of Exercise per Session: 20 min   Stress: No Stress Concern Present (1/14/2022)    [employer] - Occupational Stress Questionnaire     Feeling of Stress : Only a  little       Review of patient's allergies indicates:   Allergen Reactions    Dexamethasone      Other reaction(s): Syncope  Other reaction(s): Muscle pain  Other reaction(s): Joint pain    Erythromycin      Other reaction(s): chest pain  Rash (skin)^  Rash (skin)^      Penicillins      Rash (skin)^    Simvastatin Other (See Comments)     Leg cramps        Health Maintenance Due   Topic Date Due    RSV Vaccine (Age 60+ and Pregnant patients) (1 - 1-dose 75+ series) Never done    TETANUS VACCINE  05/01/2024    COVID-19 Vaccine (4 - 2024-25 season) 09/01/2024       Objective   Vitals:    12/02/24 1309   BP: 116/70   Pulse: 74        Physical Exam  Constitutional:       General: She is not in acute distress.     Appearance: Normal appearance. She is well-developed.   HENT:      Head: Normocephalic and atraumatic.      Right Ear: Tympanic membrane, ear canal and external ear normal.      Left Ear: Tympanic membrane, ear canal and external ear normal.      Nose: No mucosal edema or rhinorrhea.   Eyes:      Conjunctiva/sclera: Conjunctivae normal.   Pulmonary:      Effort: Pulmonary effort is normal. No respiratory distress.   Abdominal:      General: Abdomen is flat. There is no distension.   Musculoskeletal:         General: No swelling or deformity.      Right lower leg: No edema.      Left lower leg: No edema.   Skin:     General: Skin is warm and dry.      Coloration: Skin is not jaundiced.   Neurological:      Mental Status: She is alert and oriented to person, place, and time. Mental status is at baseline.   Psychiatric:         Attention and Perception: Attention and perception normal.         Mood and Affect: Mood normal.         Speech: Speech normal.         Behavior: Behavior normal. Behavior is cooperative.         Thought Content: Thought content normal.         Cognition and Memory: Cognition normal.         Judgment: Judgment normal.          Assessment & Plan   1. Prediabetes  -     Hemoglobin A1C;  Future; Expected date: 06/02/2025  -     Lipid Panel; Future; Expected date: 06/02/2025    2. Essential hypertension  Assessment & Plan:  Blood pressure is excellent despite stopping labetalol. Currently not on any blood pressure medications. Continue to monitor.    Orders:  -     Comprehensive Metabolic Panel; Future; Expected date: 06/02/2025    3. Eustachian tube dysfunction, right  Assessment & Plan:  Previously tried fluticasone nasal spray which didn't help. Switch to mometasone. Continue levocetirizine. If no improvement in 6 weeks, consider follow up with ENT.    Orders:  -     mometasone (NASONEX) 50 mcg/actuation nasal spray; 2 sprays by Nasal route once daily.  Dispense: 17 g; Refill: 11    4. Mixed hyperlipidemia  -     Lipid Panel; Future; Expected date: 06/02/2025    5. Acquired hypothyroidism  -     TSH; Future; Expected date: 12/02/2024        Follow up in 6 months. Discussed RSV vaccine.     Roberth Edwards MD  12/02/2024

## 2025-01-29 ENCOUNTER — OFFICE VISIT (OUTPATIENT)
Dept: FAMILY MEDICINE | Facility: CLINIC | Age: 80
End: 2025-01-29
Payer: MEDICARE

## 2025-01-29 VITALS
WEIGHT: 161.63 LBS | HEART RATE: 76 BPM | BODY MASS INDEX: 27.74 KG/M2 | DIASTOLIC BLOOD PRESSURE: 62 MMHG | SYSTOLIC BLOOD PRESSURE: 120 MMHG | OXYGEN SATURATION: 96 %

## 2025-01-29 DIAGNOSIS — R35.0 INCREASED URINARY FREQUENCY: Primary | ICD-10-CM

## 2025-01-29 DIAGNOSIS — R30.0 DYSURIA: ICD-10-CM

## 2025-01-29 LAB
BILIRUBIN, UA POC OHS: NEGATIVE
BLOOD, UA POC OHS: ABNORMAL
CLARITY, UA POC OHS: CLEAR
COLOR, UA POC OHS: YELLOW
GLUCOSE, UA POC OHS: NEGATIVE
KETONES, UA POC OHS: NEGATIVE
LEUKOCYTES, UA POC OHS: ABNORMAL
NITRITE, UA POC OHS: NEGATIVE
PH, UA POC OHS: 7.5
PROTEIN, UA POC OHS: NEGATIVE
SPECIFIC GRAVITY, UA POC OHS: 1.01
UROBILINOGEN, UA POC OHS: 0.2

## 2025-01-29 PROCEDURE — 1159F MED LIST DOCD IN RCRD: CPT | Mod: CPTII,S$GLB,, | Performed by: STUDENT IN AN ORGANIZED HEALTH CARE EDUCATION/TRAINING PROGRAM

## 2025-01-29 PROCEDURE — 99999 PR PBB SHADOW E&M-EST. PATIENT-LVL III: CPT | Mod: PBBFAC,,, | Performed by: STUDENT IN AN ORGANIZED HEALTH CARE EDUCATION/TRAINING PROGRAM

## 2025-01-29 PROCEDURE — 99213 OFFICE O/P EST LOW 20 MIN: CPT | Mod: S$GLB,,, | Performed by: STUDENT IN AN ORGANIZED HEALTH CARE EDUCATION/TRAINING PROGRAM

## 2025-01-29 PROCEDURE — 3078F DIAST BP <80 MM HG: CPT | Mod: CPTII,S$GLB,, | Performed by: STUDENT IN AN ORGANIZED HEALTH CARE EDUCATION/TRAINING PROGRAM

## 2025-01-29 PROCEDURE — 1101F PT FALLS ASSESS-DOCD LE1/YR: CPT | Mod: CPTII,S$GLB,, | Performed by: STUDENT IN AN ORGANIZED HEALTH CARE EDUCATION/TRAINING PROGRAM

## 2025-01-29 PROCEDURE — 3074F SYST BP LT 130 MM HG: CPT | Mod: CPTII,S$GLB,, | Performed by: STUDENT IN AN ORGANIZED HEALTH CARE EDUCATION/TRAINING PROGRAM

## 2025-01-29 PROCEDURE — 3288F FALL RISK ASSESSMENT DOCD: CPT | Mod: CPTII,S$GLB,, | Performed by: STUDENT IN AN ORGANIZED HEALTH CARE EDUCATION/TRAINING PROGRAM

## 2025-01-29 PROCEDURE — 81003 URINALYSIS AUTO W/O SCOPE: CPT | Mod: QW,S$GLB,, | Performed by: STUDENT IN AN ORGANIZED HEALTH CARE EDUCATION/TRAINING PROGRAM

## 2025-01-29 PROCEDURE — 87086 URINE CULTURE/COLONY COUNT: CPT | Performed by: STUDENT IN AN ORGANIZED HEALTH CARE EDUCATION/TRAINING PROGRAM

## 2025-01-29 RX ORDER — SULFAMETHOXAZOLE AND TRIMETHOPRIM 800; 160 MG/1; MG/1
1 TABLET ORAL 2 TIMES DAILY
Qty: 6 TABLET | Refills: 0 | Status: SHIPPED | OUTPATIENT
Start: 2025-01-29 | End: 2025-02-01

## 2025-01-29 NOTE — PROGRESS NOTES
Problem visit/UC  Chief Complaint   Patient presents with    Urinary Tract Infection       Subjective   Patient ID: Odette Hewitt is a 79 y.o. female.    HPI  Odette Hewitt is a 79 y.o.  who presents with urinary symptoms  Patient is being seen today for same day sick visit. Patient is new to provider. The patient is accompanied by daughter    The pt reports that 1 week ago she started having increase urinary frequency, urge and pain with urination. She took OTC azo which did help improve her sxs slightly but yesterday they returned. Denies odor, fever, abdominal pain        Review of Systems  Objective      Vitals:    01/29/25 1338   BP: 120/62   Pulse: 76   SpO2: 96%   Weight: 73.3 kg (161 lb 9.6 oz)       Physical Exam  Vitals reviewed.   Constitutional:       General: She is not in acute distress.     Appearance: Normal appearance. She is not ill-appearing, toxic-appearing or diaphoretic.   HENT:      Head: Normocephalic and atraumatic.   Pulmonary:      Effort: No respiratory distress.   Abdominal:      General: There is no distension.      Palpations: Abdomen is soft.      Tenderness: There is no abdominal tenderness. There is no right CVA tenderness, left CVA tenderness, guarding or rebound.   Neurological:      Mental Status: She is alert.         Procedures     Results for orders placed or performed in visit on 01/29/25   POCT Urinalysis(Instrument)    Collection Time: 01/29/25  2:18 PM   Result Value Ref Range    Color, POC UA Yellow Yellow, Straw, Colorless    Clarity, POC UA Clear Clear    Glucose, POC UA Negative Negative    Bilirubin, POC UA Negative Negative    Ketones, POC UA Negative Negative    Spec Grav POC UA 1.015 1.005 - 1.030    Blood, POC UA Trace-intact (A) Negative    pH, POC UA 7.5 5.0 - 8.0    Protein, POC UA Negative Negative    Urobilinogen, POC UA 0.2 <=1.0    Nitrite, POC UA Negative Negative    WBC, POC UA Small (A) Negative       Assessment & Plan   Increased urinary  frequency  - Pt reports 1 wk of urinary increased frequency, urgency, dysuria.   - She is a afebrile with no acute findings on physical exam.   - UA with small LE, trace blood - will send UCx  - Empiric tx with bactrim - pt reports she was able to tolerate abx with limited   -     POCT Urinalysis(Instrument)  -     Urine Culture High Risk  -     sulfamethoxazole-trimethoprim 800-160mg (BACTRIM DS) 800-160 mg Tab; Take 1 tablet by mouth 2 (two) times daily. for 3 days  Dispense: 6 tablet; Refill: 0    Dysuria  - As above   -     POCT Urinalysis(Instrument)  -     Urine Culture High Risk        Patient given return precautions and instructed to FU with PCP regarding symptoms.

## 2025-01-30 LAB — BACTERIA UR CULT: NORMAL

## 2025-02-03 ENCOUNTER — TELEPHONE (OUTPATIENT)
Dept: FAMILY MEDICINE | Facility: CLINIC | Age: 80
End: 2025-02-03
Payer: MEDICARE

## 2025-02-03 NOTE — TELEPHONE ENCOUNTER
----- Message from Antoine De Leon MD sent at 2/3/2025  2:04 PM CST -----  Hello,  Please let the patient know urine culture did not show any growth. She would have completed her bactrim by now given it was 3 days. If still has symptoms should follow up.  Thanks

## 2025-02-03 NOTE — TELEPHONE ENCOUNTER
Informed patient of her urine results per Dr. De Leon and she stated that she no longer has symptoms of a UTI.

## 2025-02-24 ENCOUNTER — TELEPHONE (OUTPATIENT)
Dept: FAMILY MEDICINE | Facility: CLINIC | Age: 80
End: 2025-02-24
Payer: MEDICARE

## 2025-02-24 ENCOUNTER — OFFICE VISIT (OUTPATIENT)
Dept: FAMILY MEDICINE | Facility: CLINIC | Age: 80
End: 2025-02-24
Payer: MEDICARE

## 2025-02-24 VITALS
HEART RATE: 80 BPM | SYSTOLIC BLOOD PRESSURE: 126 MMHG | OXYGEN SATURATION: 95 % | WEIGHT: 160.69 LBS | DIASTOLIC BLOOD PRESSURE: 78 MMHG | HEIGHT: 64 IN | BODY MASS INDEX: 27.43 KG/M2

## 2025-02-24 DIAGNOSIS — R73.03 PREDIABETES: ICD-10-CM

## 2025-02-24 DIAGNOSIS — G60.9 IDIOPATHIC PERIPHERAL NEUROPATHY: ICD-10-CM

## 2025-02-24 DIAGNOSIS — M79.604 RIGHT LEG PAIN: Primary | ICD-10-CM

## 2025-02-24 DIAGNOSIS — J43.9 PULMONARY EMPHYSEMA, UNSPECIFIED EMPHYSEMA TYPE: ICD-10-CM

## 2025-02-24 PROCEDURE — 3074F SYST BP LT 130 MM HG: CPT | Mod: CPTII,S$GLB,, | Performed by: PHYSICIAN ASSISTANT

## 2025-02-24 PROCEDURE — 99999 PR PBB SHADOW E&M-EST. PATIENT-LVL III: CPT | Mod: PBBFAC,,, | Performed by: PHYSICIAN ASSISTANT

## 2025-02-24 PROCEDURE — 1101F PT FALLS ASSESS-DOCD LE1/YR: CPT | Mod: CPTII,S$GLB,, | Performed by: PHYSICIAN ASSISTANT

## 2025-02-24 PROCEDURE — 1125F AMNT PAIN NOTED PAIN PRSNT: CPT | Mod: CPTII,S$GLB,, | Performed by: PHYSICIAN ASSISTANT

## 2025-02-24 PROCEDURE — 1159F MED LIST DOCD IN RCRD: CPT | Mod: CPTII,S$GLB,, | Performed by: PHYSICIAN ASSISTANT

## 2025-02-24 PROCEDURE — 3078F DIAST BP <80 MM HG: CPT | Mod: CPTII,S$GLB,, | Performed by: PHYSICIAN ASSISTANT

## 2025-02-24 PROCEDURE — 3288F FALL RISK ASSESSMENT DOCD: CPT | Mod: CPTII,S$GLB,, | Performed by: PHYSICIAN ASSISTANT

## 2025-02-24 PROCEDURE — 99214 OFFICE O/P EST MOD 30 MIN: CPT | Mod: S$GLB,,, | Performed by: PHYSICIAN ASSISTANT

## 2025-02-24 NOTE — TELEPHONE ENCOUNTER
----- Message from [employer] sent at 2/22/2025 10:15 AM CST -----  Type:  Sooner Appointment RequestCaller is requesting a sooner appointment.  Caller declined first available appointment listed below.  Caller will not accept being placed on the waitlist and is requesting a message be sent to doctor.Name of Caller:  ptWhen is the first available appointment?  N/ASymptoms:  cold, wet feeling going down legWould the patient rather a call back or a response via [employer]? Call [facility] Call Back Number:  [phone] Information:  pt states that first available does not work for them and needs to be seen as she wants to be seen on Monday. Pt states she has a cold wet feeling going down her leg from there thigh and feels it may be a circulation issue. Pt states the symptoms started about a week ago. Please call back and advise. Thanks!

## 2025-02-24 NOTE — PROGRESS NOTES
"Subjective:      Patient ID: Odette Hewitt is a 79 y.o. female.    Chief Complaint: Leg Pain (Patient states she has cold sensations going down her left leg lasting for about a week and a half )    Patient is new to me.    HPI  Patient's active medical problems include anxiety/depression, emphysema, HTN, HLD, hypothyroid, and osteopenia.    Patient reports episodes of "wet" sensation of right upper leg for 10 days.  She has not felt this sensation for two days.  No swelling noted.  Reports peripheral neuropathy and lumbar radiculopathy for many years.    Review of Systems   Respiratory:  Positive for shortness of breath (sometimes-not currently).    Cardiovascular:  Negative for chest pain and leg swelling.   Musculoskeletal:  Positive for arthralgias.       Objective:   /78   Pulse 80   Ht 5' 4" (1.626 m)   Wt 72.9 kg (160 lb 11.5 oz)   SpO2 95%   BMI 27.59 kg/m²     Physical Exam  Vitals reviewed.   Constitutional:       Appearance: Normal appearance. She is well-developed.   HENT:      Head: Normocephalic and atraumatic.      Right Ear: External ear normal.      Left Ear: External ear normal.   Eyes:      Conjunctiva/sclera: Conjunctivae normal.   Cardiovascular:      Rate and Rhythm: Normal rate and regular rhythm.      Heart sounds: Normal heart sounds. No murmur heard.     No friction rub. No gallop.   Pulmonary:      Effort: Pulmonary effort is normal. No respiratory distress.      Breath sounds: Normal breath sounds. No wheezing, rhonchi or rales.   Musculoskeletal:         General: Normal range of motion.      Right lower leg: No edema.      Left lower leg: No edema.   Skin:     General: Skin is warm and dry.      Findings: No rash.   Neurological:      General: No focal deficit present.      Mental Status: She is alert and oriented to person, place, and time.   Psychiatric:         Mood and Affect: Mood normal.         Behavior: Behavior normal.         Judgment: Judgment normal. "       Assessment:      1. Right leg pain    2. Idiopathic peripheral neuropathy    3. Prediabetes    4. Pulmonary emphysema, unspecified emphysema type       Plan:   1. Right leg pain (Primary)  - US Lower Extremity Veins Right; Future    2. Idiopathic peripheral neuropathy  Suspect this could be related.    3. Prediabetes  Advised patient to decrease sugar in her diet to help with possible neuropathy.    4. Pulmonary emphysema, unspecified emphysema type  Controlled.    Follow up as scheduled.  Patient agreed with plan and expressed understanding.    Thank you for allowing me to serve you,

## 2025-02-26 ENCOUNTER — HOSPITAL ENCOUNTER (OUTPATIENT)
Dept: RADIOLOGY | Facility: HOSPITAL | Age: 80
Discharge: HOME | End: 2025-02-26
Attending: PHYSICIAN ASSISTANT
Payer: MEDICARE

## 2025-02-26 ENCOUNTER — RESULTS FOLLOW-UP (OUTPATIENT)
Dept: FAMILY MEDICINE | Facility: CLINIC | Age: 80
End: 2025-02-26
Payer: MEDICARE

## 2025-02-26 DIAGNOSIS — M79.604 RIGHT LEG PAIN: ICD-10-CM

## 2025-02-26 PROCEDURE — 93971 EXTREMITY STUDY: CPT | Mod: TC,PO,RT

## 2025-02-26 PROCEDURE — 93971 EXTREMITY STUDY: CPT | Mod: 26,RT,, | Performed by: RADIOLOGY

## 2025-03-01 DIAGNOSIS — F41.9 ANXIETY: ICD-10-CM

## 2025-03-01 DIAGNOSIS — F33.0 MAJOR DEPRESSIVE DISORDER, RECURRENT, MILD: ICD-10-CM

## 2025-03-01 RX ORDER — ESCITALOPRAM OXALATE 20 MG/1
20 TABLET ORAL DAILY
Qty: 90 TABLET | Refills: 3 | Status: SHIPPED | OUTPATIENT
Start: 2025-03-01

## 2025-03-01 NOTE — TELEPHONE ENCOUNTER
Provider Staff:  Action required for this patient    Requires labs      Please see care gap opportunities below in Care Due Message.    Thanks!  [facility]     Appointments      Date Provider   Last Visit   12/2/2024 Roberth Edwards MD   Next Visit   6/4/2025 Roberth Edwards MD     Refill Decision Note   Odette Hewitt  is requesting a refill authorization.  Brief Assessment and Rationale for Refill:  Approve     Medication Therapy Plan:         Comments:     Note composed:11:33 AM 03/01/2025

## 2025-03-01 NOTE — TELEPHONE ENCOUNTER
Care Due:                  Date            Visit Type   Department     Provider  --------------------------------------------------------------------------------                                EP -                              [facility] FAMILY  Last Visit: 12-      CARE ([facility])   EMILY Edwards                               -                              [facility]  Next Visit: 06-      CARE ([facility])   EMILY Edwards                                                            Last  Test          Frequency    Reason                     Performed    Due Date  --------------------------------------------------------------------------------    CBC.........  12 months..  fenofibrate..............  Not Found    Overdue    [employer] Embedded Care Due Messages. Reference number: [id number].   3/01/2025 7:35:50 AM CST

## 2025-03-02 ENCOUNTER — DOCUMENTATION ONLY (OUTPATIENT)
Dept: FAMILY MEDICINE | Facility: CLINIC | Age: 80
End: 2025-03-02
Payer: MEDICARE

## 2025-03-02 NOTE — PROGRESS NOTES
Covered under brand    Odette Hewitt (Key: [id number])  PA Case ID #: [id number]  Need Help? Call us at [phone]  Status  Sent to Plan today  Drug  Lexapro 20MG tablets    Form  [employer] Medicare Part D Electronic Prior Authorization Form (2017 NCPDP)

## 2025-03-24 DIAGNOSIS — Z00.00 ENCOUNTER FOR MEDICARE ANNUAL WELLNESS EXAM: ICD-10-CM

## 2025-04-15 ENCOUNTER — NURSE TRIAGE (OUTPATIENT)
Dept: ADMINISTRATIVE | Facility: CLINIC | Age: 80
End: 2025-04-15
Payer: MEDICARE

## 2025-04-15 NOTE — TELEPHONE ENCOUNTER
Denies current symptoms  Was returning call that she reports came from this number. NT does not see encounter.    Thinks it was regarding home health.     Sent urgent message to provider's office asking them to call back patient to follow up.       Reason for Disposition   Follow-up information-only call to recent contact, no triage required    Protocols used: Information Only Call - No Triage-A-OH

## 2025-04-15 NOTE — TELEPHONE ENCOUNTER
Spoke to pt. She was returning a call. They did not leave a VM. I explained no one from this dept called. She verbalized understanding.

## 2025-04-22 ENCOUNTER — OFFICE VISIT (OUTPATIENT)
Dept: HOME HEALTH SERVICES | Facility: CLINIC | Age: 80
End: 2025-04-22
Payer: MEDICARE

## 2025-04-22 VITALS
DIASTOLIC BLOOD PRESSURE: 65 MMHG | SYSTOLIC BLOOD PRESSURE: 122 MMHG | WEIGHT: 161 LBS | HEART RATE: 77 BPM | HEIGHT: 64 IN | BODY MASS INDEX: 27.49 KG/M2 | OXYGEN SATURATION: 97 %

## 2025-04-22 DIAGNOSIS — E78.2 MIXED HYPERLIPIDEMIA: ICD-10-CM

## 2025-04-22 DIAGNOSIS — F33.0 MAJOR DEPRESSIVE DISORDER, RECURRENT, MILD: ICD-10-CM

## 2025-04-22 DIAGNOSIS — Z00.00 ENCOUNTER FOR MEDICARE ANNUAL WELLNESS EXAM: Primary | ICD-10-CM

## 2025-04-22 DIAGNOSIS — F41.9 ANXIETY: ICD-10-CM

## 2025-04-22 DIAGNOSIS — E03.9 HYPOTHYROIDISM, UNSPECIFIED TYPE: ICD-10-CM

## 2025-04-23 RX ORDER — LEVOCETIRIZINE DIHYDROCHLORIDE 5 MG/1
5 TABLET, FILM COATED ORAL NIGHTLY
Qty: 90 TABLET | Refills: 2 | Status: SHIPPED | OUTPATIENT
Start: 2025-04-23

## 2025-04-23 RX ORDER — FENOFIBRATE 160 MG/1
160 TABLET ORAL
Qty: 90 TABLET | Refills: 2 | Status: SHIPPED | OUTPATIENT
Start: 2025-04-23

## 2025-04-23 NOTE — TELEPHONE ENCOUNTER
Care Due:                  Date            Visit Type   Department     Provider  --------------------------------------------------------------------------------                                EP -                              [facility] FAMILY  Last Visit: 12-      CARE ([facility])   MEDICINE       Roberth Edwards                              EP -                              PRIMARY      [facility] FAMILY  Next Visit: 06-      CARE ([facility])   MEDICINE       Roberth Edwards                                                            Last  Test          Frequency    Reason                     Performed    Due Date  --------------------------------------------------------------------------------    CMP.........  12 months..  fenofibrate..............  06- 06-    Lipid Panel.  12 months..  fenofibrate..............  06- 06-    TSH.........  12 months..  levothyroxine............  06- 06-    [facility] Embedded Care Due Messages. Reference number: [id number].   4/23/2025 12:27:53 AM CDT

## 2025-04-23 NOTE — TELEPHONE ENCOUNTER
Refill Routing Note   Medication(s) are not appropriate for processing by [facility] for the following reason(s):        Required labs outdated    [facility] action(s):  Defer   Requires labs : Yes             Appointments  past 12m or future 3m with PCP    Date Provider   Last Visit   12/2/2024 Roberth Edwards MD   Next Visit   6/4/2025 Roberth Edwards MD   ED visits in past 90 days: 0        Note composed:7:44 AM 04/23/2025

## 2025-04-30 NOTE — PATIENT INSTRUCTIONS
Counseling and Referral of Other Preventative  (Italic type indicates deductible and co-insurance are waived)    Patient Name: Odette Hewitt  Today's Date: 4/30/2025    Health Maintenance       Date Due Completion Date    TETANUS VACCINE 05/01/2024 5/1/2014 (Done)    Override on 5/1/2014: Done    COVID-19 Vaccine (4 - 2024-25 season) 09/01/2024 10/12/2021    Hemoglobin A1c (Prediabetes) 06/11/2025 6/11/2024    Lipid Panel 06/11/2025 6/11/2024    DEXA Scan 06/11/2027 6/11/2024        No orders of the defined types were placed in this encounter.    The following information is provided to all patients.  This information is to help you find resources for any of the problems found today that may be affecting your health:                  Living healthy guide: www.[facility].[place].gov      Understanding Diabetes: www.diabetes.org      Eating healthy: www.cdc.gov/healthyweight      [facility] home safety checklist: www.cdc.gov/steadi/patient.html      Agency on Aging: www.goea.[place].[facility]      Alcoholics anonymous (AA): www.aa.org      Physical Activity: www.angie.nih.gov/ru6njtt      Tobacco use: www.quitwithusla.org         
Bernadine Webster RN CM

## 2025-04-30 NOTE — PROGRESS NOTES
"Odette Hewitt presented for a follow-up Medicare AWV today. The following components were reviewed and updated:    Medical history  Family History  Social history  Allergies and Current Medications  Health Risk Assessment  Health Maintenance  Care Team    **See Completed Assessments for Annual Wellness visit with in the encounter summary    The following assessments were completed:  Depression Screening  Cognitive function Screening  Timed Get Up Test  Whisper Test      Opioid documentation:      Patient does not have a current opioid prescription.          Vitals:    04/22/25 1052   BP: 122/65   Pulse: 77   SpO2: 97%   Weight: 73 kg (161 lb)   Height: 5' 4" (1.626 m)     Body mass index is 27.64 kg/m².       Physical Exam  Constitutional:       Appearance: Normal appearance.   HENT:      Head: Normocephalic and atraumatic.      Nose: Nose normal.      Mouth/Throat:      Mouth: Mucous membranes are moist.   Eyes:      Extraocular Movements: Extraocular movements intact.      Pupils: Pupils are equal, round, and reactive to light.   Cardiovascular:      Rate and Rhythm: Normal rate and regular rhythm.   Pulmonary:      Effort: Pulmonary effort is normal.      Breath sounds: Normal breath sounds.   Abdominal:      General: Bowel sounds are normal.      Palpations: Abdomen is soft.   Musculoskeletal:         General: Normal range of motion.      Cervical back: Normal range of motion and neck supple.   Skin:     General: Skin is warm and dry.   Neurological:      General: No focal deficit present.      Mental Status: She is alert and oriented to person, place, and time.   Psychiatric:         Mood and Affect: Mood normal.         Behavior: Behavior normal.           Diagnoses and health risks identified today and associated recommendations/orders:  1. Encounter for Medicare annual wellness exam  Awv completed      2. Major depressive disorder, recurrent, mild  On lexapro - stable    3. Anxiety  On lexparo - " stable      4. Mixed hyperlipidemia  Not on any meds - - diet controlled    5. Hypothyroidism, unspecified type  On synthroid TSH 3.27  Followed with PCP        Provided Odette with a 5-10 year written screening schedule and personal prevention plan. Recommendations were developed using the USPSTF age appropriate recommendations. Education, counseling, and referrals were provided as needed.  After Visit Summary printed and given to patient which includes a list of additional screenings\tests needed.    Fu in 1 yr for dorota Cintron, CHAPO, APRN

## 2025-05-31 NOTE — TELEPHONE ENCOUNTER
Spoke with patient and gave results over the phone, patient verbalized understanding.  
inform pt via phone that I reviewed the test results and note the following:    Pulmonary function test was normal.   
No

## 2025-06-04 ENCOUNTER — OFFICE VISIT (OUTPATIENT)
Dept: FAMILY MEDICINE | Facility: CLINIC | Age: 80
End: 2025-06-04
Payer: MEDICARE

## 2025-06-04 ENCOUNTER — LAB VISIT (OUTPATIENT)
Dept: LAB | Facility: HOSPITAL | Age: 80
End: 2025-06-04
Attending: STUDENT IN AN ORGANIZED HEALTH CARE EDUCATION/TRAINING PROGRAM
Payer: MEDICARE

## 2025-06-04 VITALS
BODY MASS INDEX: 27.43 KG/M2 | DIASTOLIC BLOOD PRESSURE: 76 MMHG | HEART RATE: 68 BPM | OXYGEN SATURATION: 97 % | WEIGHT: 160.69 LBS | SYSTOLIC BLOOD PRESSURE: 120 MMHG | HEIGHT: 64 IN

## 2025-06-04 DIAGNOSIS — E03.9 ACQUIRED HYPOTHYROIDISM: ICD-10-CM

## 2025-06-04 DIAGNOSIS — R20.0 RIGHT LEG NUMBNESS: ICD-10-CM

## 2025-06-04 DIAGNOSIS — M70.51 PES ANSERINUS BURSITIS OF BOTH KNEES: ICD-10-CM

## 2025-06-04 DIAGNOSIS — H69.91 EUSTACHIAN TUBE DYSFUNCTION, RIGHT: ICD-10-CM

## 2025-06-04 DIAGNOSIS — E78.2 MIXED HYPERLIPIDEMIA: ICD-10-CM

## 2025-06-04 DIAGNOSIS — F33.0 MAJOR DEPRESSIVE DISORDER, RECURRENT, MILD: ICD-10-CM

## 2025-06-04 DIAGNOSIS — R73.03 PREDIABETES: ICD-10-CM

## 2025-06-04 DIAGNOSIS — I10 ESSENTIAL HYPERTENSION: ICD-10-CM

## 2025-06-04 DIAGNOSIS — M70.52 PES ANSERINUS BURSITIS OF BOTH KNEES: ICD-10-CM

## 2025-06-04 DIAGNOSIS — I10 ESSENTIAL HYPERTENSION: Primary | ICD-10-CM

## 2025-06-04 PROBLEM — R61 DIAPHORESIS: Status: RESOLVED | Noted: 2024-05-27 | Resolved: 2025-06-04

## 2025-06-04 LAB
ALBUMIN SERPL BCP-MCNC: 3.4 G/DL (ref 3.5–5.2)
ALP SERPL-CCNC: 54 UNIT/L (ref 40–150)
ALT SERPL W/O P-5'-P-CCNC: 20 UNIT/L (ref 10–44)
ANION GAP (OHS): 11 MMOL/L (ref 8–16)
AST SERPL-CCNC: 24 UNIT/L (ref 11–45)
BILIRUB SERPL-MCNC: 0.3 MG/DL (ref 0.1–1)
BUN SERPL-MCNC: 17 MG/DL (ref 8–23)
CALCIUM SERPL-MCNC: 9.1 MG/DL (ref 8.7–10.5)
CHLORIDE SERPL-SCNC: 108 MMOL/L (ref 95–110)
CHOLEST SERPL-MCNC: 173 MG/DL (ref 120–199)
CHOLEST/HDLC SERPL: 4.9 {RATIO} (ref 2–5)
CO2 SERPL-SCNC: 22 MMOL/L (ref 23–29)
CREAT SERPL-MCNC: 0.8 MG/DL (ref 0.5–1.4)
EAG (OHS): 123 MG/DL (ref 68–131)
GFR SERPLBLD CREATININE-BSD FMLA CKD-EPI: >60 ML/MIN/1.73/M2
GLUCOSE SERPL-MCNC: 117 MG/DL (ref 70–110)
HBA1C MFR BLD: 5.9 % (ref 4–5.6)
HDLC SERPL-MCNC: 35 MG/DL (ref 40–75)
HDLC SERPL: 20.2 % (ref 20–50)
LDLC SERPL CALC-MCNC: 114.4 MG/DL (ref 63–159)
NONHDLC SERPL-MCNC: 138 MG/DL
POTASSIUM SERPL-SCNC: 4 MMOL/L (ref 3.5–5.1)
PROT SERPL-MCNC: 6.8 GM/DL (ref 6–8.4)
SODIUM SERPL-SCNC: 141 MMOL/L (ref 136–145)
T4 FREE SERPL-MCNC: 1.08 NG/DL (ref 0.71–1.51)
TRIGL SERPL-MCNC: 118 MG/DL (ref 30–150)
TSH SERPL-ACNC: 6.64 UIU/ML (ref 0.4–4)

## 2025-06-04 PROCEDURE — 3288F FALL RISK ASSESSMENT DOCD: CPT | Mod: CPTII,S$GLB,, | Performed by: STUDENT IN AN ORGANIZED HEALTH CARE EDUCATION/TRAINING PROGRAM

## 2025-06-04 PROCEDURE — 83036 HEMOGLOBIN GLYCOSYLATED A1C: CPT

## 2025-06-04 PROCEDURE — 84443 ASSAY THYROID STIM HORMONE: CPT

## 2025-06-04 PROCEDURE — 84155 ASSAY OF PROTEIN SERUM: CPT | Mod: PO

## 2025-06-04 PROCEDURE — 80061 LIPID PANEL: CPT

## 2025-06-04 PROCEDURE — 3074F SYST BP LT 130 MM HG: CPT | Mod: CPTII,S$GLB,, | Performed by: STUDENT IN AN ORGANIZED HEALTH CARE EDUCATION/TRAINING PROGRAM

## 2025-06-04 PROCEDURE — 3078F DIAST BP <80 MM HG: CPT | Mod: CPTII,S$GLB,, | Performed by: STUDENT IN AN ORGANIZED HEALTH CARE EDUCATION/TRAINING PROGRAM

## 2025-06-04 PROCEDURE — 1101F PT FALLS ASSESS-DOCD LE1/YR: CPT | Mod: CPTII,S$GLB,, | Performed by: STUDENT IN AN ORGANIZED HEALTH CARE EDUCATION/TRAINING PROGRAM

## 2025-06-04 PROCEDURE — 36415 COLL VENOUS BLD VENIPUNCTURE: CPT | Mod: PO

## 2025-06-04 PROCEDURE — G2211 COMPLEX E/M VISIT ADD ON: HCPCS | Mod: S$GLB,,, | Performed by: STUDENT IN AN ORGANIZED HEALTH CARE EDUCATION/TRAINING PROGRAM

## 2025-06-04 PROCEDURE — 99214 OFFICE O/P EST MOD 30 MIN: CPT | Mod: S$GLB,,, | Performed by: STUDENT IN AN ORGANIZED HEALTH CARE EDUCATION/TRAINING PROGRAM

## 2025-06-04 PROCEDURE — 1125F AMNT PAIN NOTED PAIN PRSNT: CPT | Mod: CPTII,S$GLB,, | Performed by: STUDENT IN AN ORGANIZED HEALTH CARE EDUCATION/TRAINING PROGRAM

## 2025-06-04 PROCEDURE — 1159F MED LIST DOCD IN RCRD: CPT | Mod: CPTII,S$GLB,, | Performed by: STUDENT IN AN ORGANIZED HEALTH CARE EDUCATION/TRAINING PROGRAM

## 2025-06-04 PROCEDURE — 84439 ASSAY OF FREE THYROXINE: CPT

## 2025-06-04 PROCEDURE — 99999 PR PBB SHADOW E&M-EST. PATIENT-LVL III: CPT | Mod: PBBFAC,,, | Performed by: STUDENT IN AN ORGANIZED HEALTH CARE EDUCATION/TRAINING PROGRAM

## 2025-06-06 ENCOUNTER — RESULTS FOLLOW-UP (OUTPATIENT)
Dept: FAMILY MEDICINE | Facility: CLINIC | Age: 80
End: 2025-06-06

## 2025-06-18 ENCOUNTER — OFFICE VISIT (OUTPATIENT)
Dept: OTOLARYNGOLOGY | Facility: CLINIC | Age: 80
End: 2025-06-18
Payer: MEDICARE

## 2025-06-18 VITALS — BODY MASS INDEX: 27.46 KG/M2 | WEIGHT: 160 LBS

## 2025-06-18 DIAGNOSIS — H69.91 EUSTACHIAN TUBE DYSFUNCTION, RIGHT: Primary | ICD-10-CM

## 2025-06-18 DIAGNOSIS — H65.21 RIGHT CHRONIC SEROUS OTITIS MEDIA: ICD-10-CM

## 2025-06-18 DIAGNOSIS — H69.93 ETD (EUSTACHIAN TUBE DYSFUNCTION), BILATERAL: Primary | ICD-10-CM

## 2025-06-18 PROCEDURE — 99214 OFFICE O/P EST MOD 30 MIN: CPT | Mod: S$GLB,,, | Performed by: STUDENT IN AN ORGANIZED HEALTH CARE EDUCATION/TRAINING PROGRAM

## 2025-06-18 PROCEDURE — 1101F PT FALLS ASSESS-DOCD LE1/YR: CPT | Mod: CPTII,S$GLB,, | Performed by: STUDENT IN AN ORGANIZED HEALTH CARE EDUCATION/TRAINING PROGRAM

## 2025-06-18 PROCEDURE — 1126F AMNT PAIN NOTED NONE PRSNT: CPT | Mod: CPTII,S$GLB,, | Performed by: STUDENT IN AN ORGANIZED HEALTH CARE EDUCATION/TRAINING PROGRAM

## 2025-06-18 PROCEDURE — 1159F MED LIST DOCD IN RCRD: CPT | Mod: CPTII,S$GLB,, | Performed by: STUDENT IN AN ORGANIZED HEALTH CARE EDUCATION/TRAINING PROGRAM

## 2025-06-18 PROCEDURE — 3288F FALL RISK ASSESSMENT DOCD: CPT | Mod: CPTII,S$GLB,, | Performed by: STUDENT IN AN ORGANIZED HEALTH CARE EDUCATION/TRAINING PROGRAM

## 2025-06-18 PROCEDURE — 99999 PR PBB SHADOW E&M-EST. PATIENT-LVL II: CPT | Mod: PBBFAC,,, | Performed by: STUDENT IN AN ORGANIZED HEALTH CARE EDUCATION/TRAINING PROGRAM

## 2025-06-18 NOTE — PROGRESS NOTES
Otolaryngology Clinic Note    Subjective:       Patient ID: Odette Hewitt is a 79 y.o. female.    Chief Complaint: Ear Fullness      3/23/23 History of Present Illness: Odette Hewitt is a 79 y.o. female presenting with concerns about ear. Does not think tube on right helped at all. Did not help with fullness at all. Right ear has been worse than left. Feels underwater. Can pop, right with gurgles. Some sinus issues now. Cannot breathe at night. No drainage. Hearing seems stable. Mouth breathes at night. Has tried nasal sprays. Does not use regularly. No CPAP. Ears are always stuffy, not related to nose. Has been dizzy once. Has had this every few years in past as well. Has had steroids, tubes, etc in past. This time lasting 3 years. Reports once has left paralysis with steroid shot.   Dr. Medina placed tube on right for effusion 1/2022. Has visits here going back to 2014 for same issue. 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    6/18/25: still with fullness in both ears. Worse on right. Has not tried to pop lately but denies helping or hearing fluid. Feels like hands over her ears. No pain. No tinnitus. Did not benefit from tube so did not replace last visit. Did flonase/astelin and xyzal and did not help. Still using xyzal.       Past Surgical History:   Procedure Laterality Date    HYSTERECTOMY      TONSILLECTOMY      TYMPANOSTOMY TUBE PLACEMENT       Past Medical History:   Diagnosis Date    Chronic arm pain     on pamelor    HTN (hypertension)     Hyperlipidemia     Hypothyroid 2006    Osteopenia     Refusal of blood transfusions as patient is Yazidi      Social Drivers of Health     Tobacco Use: Low Risk  (6/4/2025)    Patient History     Smoking Tobacco Use: Never     Smokeless Tobacco Use: Never     Passive Exposure: Not on file   Alcohol Use: Not At Risk (4/22/2025)    AUDIT-C     Frequency of Alcohol Consumption: Never     Average Number of  Drinks: Not on file     Frequency of Binge Drinking: Not on file   Financial Resource Strain: Low Risk  (4/22/2025)    Overall Financial Resource Strain (CARDIA)     Difficulty of Paying Living Expenses: Not hard at all   Food Insecurity: No Food Insecurity (4/22/2025)    Hunger Vital Sign     Worried About Running Out of Food in the Last Year: Never true     Ran Out of Food in the Last Year: Never true   Transportation Needs: No Transportation Needs (4/22/2025)    PRAPARE - Transportation     Lack of Transportation (Medical): No     Lack of Transportation (Non-Medical): No   Physical Activity: Sufficiently Active (4/22/2025)    Exercise Vital Sign     Days of Exercise per Week: 5 days     Minutes of Exercise per Session: 30 min   Stress: No Stress Concern Present (4/22/2025)    [employer] - Occupational Stress Questionnaire     Feeling of Stress : Not at all   Housing Stability: Low Risk  (4/22/2025)    Housing Stability Vital Sign     Unable to Pay for Housing in the Last Year: No     Number of Times Moved in the Last Year: Not on file     Homeless in the Last Year: No   Depression: Low Risk  (4/22/2025)    Depression     Last PHQ-4: Flowsheet Data: 0   Utilities: Not At Risk (4/22/2025)    [facility] Utilities     Threatened with loss of utilities: No   Health Literacy: Adequate Health Literacy (4/22/2025)     Health Literacy     Frequency of need for help with medical instructions: Never   Social Isolation: Not on file     Review of patient's allergies indicates:   Allergen Reactions    Dexamethasone      Other reaction(s): Syncope  Other reaction(s): Muscle pain  Other reaction(s): Joint pain    Erythromycin      Other reaction(s): chest pain  Rash (skin)^  Rash (skin)^      Penicillins      Rash (skin)^    Simvastatin Other (See Comments)     Leg cramps     Current Outpatient Medications   Medication Instructions    EScitalopram oxalate (LEXAPRO) 20 mg, Oral, Daily, Due for annual  with PCP    fenofibrate 160 mg, Oral    fish oil-omega-3 fatty acids 300-1,000 mg capsule 2 g, Daily    levocetirizine (XYZAL) 5 mg, Oral, Nightly    levothyroxine (SYNTHROID) 75 mcg, Oral, Before breakfast    mometasone (NASONEX) 50 mcg/actuation nasal spray 2 sprays, Nasal, Daily         ENT ROS negative except as stated above.     Patient answers are not available for this visit.            Objective:      There were no vitals filed for this visit.    General: NAD, well appearing  Eyes: Normal conjunctiva and lids  Face: symmetric, nerve intact  Nose: The nose is without any evidence of any deformity. The nasal mucosa is moist. The septum is midline, spur on left on floor and small on right. There is no evidence of septal hematoma. The turbinates are without abnormality.   Ears: The ears are with normal-appearing pinna. Examination of the canals is normal appearing bilaterally. There is no drainage or erythema noted. . Hearing is grossly intact.  Otomicroscopy: Right serous effusion, retraction anterior with mild atelectasis which moved with valsalva. left normal. Valsalva + with significant effort.   Mouth: No obvious abnormalities to the lips. The teeth are unremarkable. The gingivae are without any obvious evidence of infection or lesion. The oral mucosa is moist and pink. There are no obvious masses to the hard or soft palate.   Oropharynx: The uvula is midline.  The tongue is midline. The posterior pharynx is without erythema or exudate. The tonsils are normal appearing.  Salivary glands: The salivary glands are symmetric and not enlarged, no masses  Neck: No lymphadenopathy, trachea midline, thryoid not enlarged.  Psych: Normal mood and affect.   Neuro: Grossly intact  Speech: fluent         Assessment and Plan:       1. ETD (Eustachian tube dysfunction), bilateral    2. Right chronic serous otitis media          Reviewed ETD and anatomy, she has tried nasal sprays and oral AH many times. She still has  fluid on right and retraction.   She did not benefit from tube in past. Offered repeat tube, could try for eustachian tube dilation. If not covered, could not bill.   Will try eustachi device TID, follow up 4 weeks.   Daily xyzal    RTC: 4 weeks recheck with audiogram    Plan of care was discussed in detail with the patient, who agreed with the plan as above. All questions were answered in detail.     Yael Haque MD  Otolaryngology